# Patient Record
Sex: FEMALE | Race: OTHER | Employment: OTHER | URBAN - METROPOLITAN AREA
[De-identification: names, ages, dates, MRNs, and addresses within clinical notes are randomized per-mention and may not be internally consistent; named-entity substitution may affect disease eponyms.]

---

## 2017-07-28 ENCOUNTER — TELEPHONE (OUTPATIENT)
Dept: NEUROLOGY | Facility: CLINIC | Age: 82
End: 2017-07-28

## 2017-08-01 ENCOUNTER — OFFICE VISIT (OUTPATIENT)
Dept: NEUROLOGY | Facility: CLINIC | Age: 82
End: 2017-08-01

## 2017-08-01 ENCOUNTER — TELEPHONE (OUTPATIENT)
Dept: NEUROLOGY | Facility: CLINIC | Age: 82
End: 2017-08-01

## 2017-08-01 VITALS
WEIGHT: 125 LBS | HEIGHT: 55 IN | RESPIRATION RATE: 16 BRPM | SYSTOLIC BLOOD PRESSURE: 120 MMHG | DIASTOLIC BLOOD PRESSURE: 64 MMHG | BODY MASS INDEX: 28.93 KG/M2 | HEART RATE: 72 BPM

## 2017-08-01 DIAGNOSIS — G93.40 ENCEPHALOPATHY: Primary | ICD-10-CM

## 2017-08-01 PROBLEM — E78.5 HYPERLIPIDEMIA: Status: ACTIVE | Noted: 2017-08-01

## 2017-08-01 PROBLEM — I25.10 CORONARY ARTERY DISEASE: Status: ACTIVE | Noted: 2017-08-01

## 2017-08-01 PROBLEM — F03.91 DEMENTIA WITH BEHAVIORAL DISTURBANCE (HCC): Status: ACTIVE | Noted: 2017-08-01

## 2017-08-01 PROCEDURE — 99204 OFFICE O/P NEW MOD 45 MIN: CPT | Performed by: OTHER

## 2017-08-01 RX ORDER — PRAVASTATIN SODIUM 10 MG
1 TABLET ORAL DAILY
Refills: 0 | Status: ON HOLD | COMMUNITY
Start: 2017-07-14 | End: 2019-11-01

## 2017-08-01 RX ORDER — MECLIZINE HCL 12.5 MG/1
1 TABLET ORAL AS NEEDED
Refills: 0 | COMMUNITY
Start: 2017-07-18

## 2017-08-01 NOTE — TELEPHONE ENCOUNTER
Pt. informed insurance was verified and MRI brain wo is a covered benefit and does not require authorization. Can proceed with scheduling appt. Akin Mcintosh informed no authorization is required. Scheduled MRI on 08/07/17 a Martir.

## 2017-08-01 NOTE — PROGRESS NOTES
Dominguez Hurtadoehan : 1930     HPI:   Patient presents with:  Memory Loss: New patient here for memory loss. Patient here w/ son he states he noticed pt is watching TV and starts talking back. Pt's son stated pt had MRI of Brain done about 4 yrs ago. Surgical History:  No date: KNEE SURGERY Right  No date: SURGICAL STENT   History reviewed. No pertinent family history.    Social History:  Social History    Marital status: Single              Spouse name:                       Years of education: unable to tell me her age. She had some difficulty following simple commands. Cranial Nerves: II-visual fields are full in the left eye. She has no light perception in the right eye. Left fundus exam was unremarkable. .  III,IV,VI- EOM full, with normal

## 2017-08-02 NOTE — TELEPHONE ENCOUNTER
Records:  CT brain in 8/15  Chronic white matter changes. Lab 8/17 Chol 246.  FBS 74. Will need TSH and B12.

## 2017-08-07 ENCOUNTER — HOSPITAL ENCOUNTER (OUTPATIENT)
Dept: MRI IMAGING | Age: 82
Discharge: HOME | End: 2017-08-07
Attending: Other
Payer: MEDICARE

## 2017-08-07 ENCOUNTER — TELEPHONE (OUTPATIENT)
Dept: NEUROLOGY | Facility: CLINIC | Age: 82
End: 2017-08-07

## 2017-08-07 DIAGNOSIS — F02.80 ALZHEIMER'S DISEASE OF OTHER ONSET WITHOUT BEHAVIORAL DISTURBANCE: ICD-10-CM

## 2017-08-07 DIAGNOSIS — G30.8 ALZHEIMER'S DISEASE OF OTHER ONSET WITHOUT BEHAVIORAL DISTURBANCE: ICD-10-CM

## 2017-08-07 DIAGNOSIS — G93.40 ENCEPHALOPATHY: ICD-10-CM

## 2017-08-07 DIAGNOSIS — G93.40 ENCEPHALOPATHY: Primary | ICD-10-CM

## 2017-08-07 PROCEDURE — 70551 MRI BRAIN STEM W/O DYE: CPT | Performed by: OTHER

## 2019-03-30 ENCOUNTER — APPOINTMENT (OUTPATIENT)
Dept: GENERAL RADIOLOGY | Facility: HOSPITAL | Age: 84
End: 2019-03-30
Attending: EMERGENCY MEDICINE
Payer: MEDICARE

## 2019-03-30 ENCOUNTER — HOSPITAL ENCOUNTER (EMERGENCY)
Facility: HOSPITAL | Age: 84
Discharge: NURSING, REHAB OR LONG-TERM CARE | End: 2019-03-30
Attending: EMERGENCY MEDICINE
Payer: MEDICARE

## 2019-03-30 VITALS
SYSTOLIC BLOOD PRESSURE: 108 MMHG | BODY MASS INDEX: 24.3 KG/M2 | OXYGEN SATURATION: 99 % | WEIGHT: 105 LBS | RESPIRATION RATE: 18 BRPM | DIASTOLIC BLOOD PRESSURE: 57 MMHG | HEIGHT: 55 IN | TEMPERATURE: 98 F | HEART RATE: 82 BPM

## 2019-03-30 DIAGNOSIS — J06.9 UPPER RESPIRATORY TRACT INFECTION, UNSPECIFIED TYPE: ICD-10-CM

## 2019-03-30 DIAGNOSIS — S61.412A LACERATION OF LEFT HAND WITHOUT FOREIGN BODY, INITIAL ENCOUNTER: Primary | ICD-10-CM

## 2019-03-30 PROCEDURE — 71045 X-RAY EXAM CHEST 1 VIEW: CPT | Performed by: EMERGENCY MEDICINE

## 2019-03-30 PROCEDURE — 90471 IMMUNIZATION ADMIN: CPT

## 2019-03-30 PROCEDURE — 99284 EMERGENCY DEPT VISIT MOD MDM: CPT

## 2019-03-30 PROCEDURE — 12002 RPR S/N/AX/GEN/TRNK2.6-7.5CM: CPT

## 2019-03-30 NOTE — ED NOTES
Spoke with Carl Archer RN at [facility]. Informed her of treatment here in ER and instructed her that sutures should be removed in 7-10 days. Patient awaits ambulance transport back to [facility].

## 2019-03-30 NOTE — ED NOTES
Son at the bedside. Son did not know what happened to her mom why she has the laceration. Son not sure about tetanus shot.

## 2019-10-24 ENCOUNTER — APPOINTMENT (OUTPATIENT)
Dept: GENERAL RADIOLOGY | Facility: HOSPITAL | Age: 84
DRG: 208 | End: 2019-10-24
Attending: EMERGENCY MEDICINE
Payer: MEDICARE

## 2019-10-24 ENCOUNTER — APPOINTMENT (OUTPATIENT)
Dept: CT IMAGING | Facility: HOSPITAL | Age: 84
DRG: 208 | End: 2019-10-24
Attending: INTERNAL MEDICINE
Payer: MEDICARE

## 2019-10-24 ENCOUNTER — HOSPITAL ENCOUNTER (INPATIENT)
Facility: HOSPITAL | Age: 84
LOS: 8 days | Discharge: NURSING, REHAB OR LONG-TERM CARE | DRG: 208 | End: 2019-11-01
Attending: EMERGENCY MEDICINE | Admitting: INTERNAL MEDICINE
Payer: MEDICARE

## 2019-10-24 DIAGNOSIS — R41.82 ALTERED MENTAL STATUS, UNSPECIFIED ALTERED MENTAL STATUS TYPE: ICD-10-CM

## 2019-10-24 DIAGNOSIS — J96.01 ACUTE HYPOXEMIC RESPIRATORY FAILURE (HCC): Primary | ICD-10-CM

## 2019-10-24 PROCEDURE — 71045 X-RAY EXAM CHEST 1 VIEW: CPT | Performed by: EMERGENCY MEDICINE

## 2019-10-24 PROCEDURE — 5A09357 ASSISTANCE WITH RESPIRATORY VENTILATION, LESS THAN 24 CONSECUTIVE HOURS, CONTINUOUS POSITIVE AIRWAY PRESSURE: ICD-10-PCS | Performed by: EMERGENCY MEDICINE

## 2019-10-24 PROCEDURE — 70450 CT HEAD/BRAIN W/O DYE: CPT | Performed by: INTERNAL MEDICINE

## 2019-10-24 PROCEDURE — 0BH17EZ INSERTION OF ENDOTRACHEAL AIRWAY INTO TRACHEA, VIA NATURAL OR ARTIFICIAL OPENING: ICD-10-PCS | Performed by: EMERGENCY MEDICINE

## 2019-10-24 PROCEDURE — 5A1945Z RESPIRATORY VENTILATION, 24-96 CONSECUTIVE HOURS: ICD-10-PCS | Performed by: EMERGENCY MEDICINE

## 2019-10-24 PROCEDURE — 99223 1ST HOSP IP/OBS HIGH 75: CPT | Performed by: INTERNAL MEDICINE

## 2019-10-24 RX ORDER — ETOMIDATE 2 MG/ML
20 INJECTION INTRAVENOUS ONCE
Status: COMPLETED | OUTPATIENT
Start: 2019-10-24 | End: 2019-10-24

## 2019-10-24 RX ORDER — ETOMIDATE 2 MG/ML
INJECTION INTRAVENOUS
Status: DISPENSED
Start: 2019-10-24 | End: 2019-10-25

## 2019-10-24 RX ORDER — SODIUM CHLORIDE 9 MG/ML
INJECTION, SOLUTION INTRAVENOUS CONTINUOUS
Status: DISCONTINUED | OUTPATIENT
Start: 2019-10-24 | End: 2019-11-01

## 2019-10-24 RX ORDER — MIDAZOLAM HYDROCHLORIDE 5 MG/ML
2 INJECTION INTRAMUSCULAR; INTRAVENOUS ONCE
Status: COMPLETED | OUTPATIENT
Start: 2019-10-24 | End: 2019-10-24

## 2019-10-24 RX ORDER — ROCURONIUM BROMIDE 10 MG/ML
70 INJECTION, SOLUTION INTRAVENOUS ONCE
Status: COMPLETED | OUTPATIENT
Start: 2019-10-24 | End: 2019-10-24

## 2019-10-24 RX ORDER — MIDAZOLAM HYDROCHLORIDE 5 MG/ML
INJECTION INTRAMUSCULAR; INTRAVENOUS
Status: DISPENSED
Start: 2019-10-24 | End: 2019-10-25

## 2019-10-24 NOTE — ED PROVIDER NOTES
Patient Seen in: [facility] Emergency Department      History   Patient presents with:  Dyspnea MARGRET SOB (respiratory)    Stated Complaint: sob    HPI  History is provided by EMS.     27-year-old female with history of hypertension, hyperlipidemia, Rhythm: Regular rhythm. Comments: Tachycardic  Pulmonary:      Breath sounds: Rales present. Abdominal:      Palpations: Abdomen is soft. Musculoskeletal:         General: No tenderness. Skin:     General: Skin is warm.    Neurological:      Comm visualization of the cords after suctioning  Cricoid pressure was applied throughout the procedure. A 7.0 size endotracheal tube was placed at 24 cm at the lip.   Tube placement verified by direct visualization, condensation in the tube, and positive ETCO2 see previous RBC morphology.     Platelet Morphology Normal Normal    Macrocytosis 1+      Polychromasia 1+     RAINBOW DRAW BLUE    Collection Time: 10/24/19  4:17 PM   Result Value Ref Range    Hold Blue Auto Resulted    RAINBOW DRAW LIGHT GREEN    [place] Negative None Seen /HPF   ARTERIAL BLOOD GAS    Collection Time: 10/24/19  4:50 PM   Result Value Ref Range    Sample Site Left Radial     ABG pH 7.33 (L) 7.35 - 7.45    ABG pCO2 52 (H) 35 - 45 mm Hg    ABG PO2 381 (H) 80 - 100 mm Hg    ABG HCO3 25.4 21.0 evaluation.      89yoF with difficulty breathing  - I personally reviewed and interpreted all the ED vitals  - afebrile, hypoxic  - I ordered and personally reviewed the labs and imaging and found XR clear, leukocytosis, no anemia, electrolytes reassuring, provider specified. We recommend that you schedule follow up care with a primary care provider within the next three months to obtain basic health screening including reassessment of your blood pressure.     Medications Prescribed:  Current Discharge Medic

## 2019-10-24 NOTE — ED INITIAL ASSESSMENT (HPI)
Found by snf staff in acute resp distress pta; possible aspiration. bipap on place from ems. Drooling. Awake, protecting own airway at this time. Placed on bipap upon arrival to er.

## 2019-10-24 NOTE — ED NOTES
Resting on cart in no distress. Family at cartside. Awaiting admission at this time. Will continue to monitor.

## 2019-10-24 NOTE — ED NOTES
To ct per cart with rn, er tech, and rt. Remains on continuous cardiac monitoring with frequent vitals.

## 2019-10-24 NOTE — H&P
[facility] [address] Patient Status:  Emergency    1930 MRN [id number]   Location [address] Attending Annel Sullivan MD   Hosp Day # 0 PCP Doc Neville MD temperature 99.6 °F (37.6 °C), temperature source Temporal, resp. rate 21, height 4' 7\" (1.397 m), weight 103 lb 9.9 oz (47 kg), SpO2 100 %, not currently breastfeeding.     Constitutional: Intubated, sedated  HEENT: PERRL  Cardio: RRR, S1 S2  Respiratory: arrival.  -Initial suspicion for possible pulmonary edema although chest imaging with no significant edema or infiltrate seen.   -CT head and ABG pending  -Full ventilatory support at this time  -Empiric antibiotic therapy with Zosyn at this time  -IV hydra

## 2019-10-24 NOTE — ED NOTES
Family at [facility], updated about condition. Awaiting icu bed at this time. Will continue to monitor.

## 2019-10-25 ENCOUNTER — APPOINTMENT (OUTPATIENT)
Dept: GENERAL RADIOLOGY | Facility: HOSPITAL | Age: 84
DRG: 208 | End: 2019-10-25
Attending: INTERNAL MEDICINE
Payer: MEDICARE

## 2019-10-25 PROCEDURE — 71045 X-RAY EXAM CHEST 1 VIEW: CPT | Performed by: INTERNAL MEDICINE

## 2019-10-25 PROCEDURE — 99233 SBSQ HOSP IP/OBS HIGH 50: CPT | Performed by: INTERNAL MEDICINE

## 2019-10-25 RX ORDER — ATORVASTATIN CALCIUM 40 MG/1
40 TABLET, FILM COATED ORAL DAILY
Status: DISCONTINUED | OUTPATIENT
Start: 2019-10-25 | End: 2019-11-01

## 2019-10-25 RX ORDER — POLYETHYLENE GLYCOL 3350 17 G/17G
17 POWDER, FOR SOLUTION ORAL DAILY
COMMUNITY

## 2019-10-25 RX ORDER — KETOROLAC TROMETHAMINE 15 MG/ML
15 INJECTION, SOLUTION INTRAMUSCULAR; INTRAVENOUS ONCE
Status: COMPLETED | OUTPATIENT
Start: 2019-10-25 | End: 2019-10-25

## 2019-10-25 RX ORDER — ATORVASTATIN CALCIUM 40 MG/1
40 TABLET, FILM COATED ORAL DAILY
Status: ON HOLD | COMMUNITY
End: 2020-02-06

## 2019-10-25 RX ORDER — CALCIUM CARBONATE/VITAMIN D3 600 MG-10
1 TABLET ORAL DAILY
Status: ON HOLD | COMMUNITY
End: 2020-02-06

## 2019-10-25 RX ORDER — POTASSIUM CHLORIDE 14.9 MG/ML
20 INJECTION INTRAVENOUS ONCE
Status: COMPLETED | OUTPATIENT
Start: 2019-10-25 | End: 2019-10-25

## 2019-10-25 RX ORDER — ALENDRONATE SODIUM 35 MG/1
35 TABLET ORAL
Status: DISCONTINUED | OUTPATIENT
Start: 2019-10-27 | End: 2019-10-27

## 2019-10-25 RX ORDER — HEPARIN SODIUM 5000 [USP'U]/ML
5000 INJECTION, SOLUTION INTRAVENOUS; SUBCUTANEOUS EVERY 8 HOURS SCHEDULED
Status: DISCONTINUED | OUTPATIENT
Start: 2019-10-25 | End: 2019-10-28

## 2019-10-25 RX ORDER — ENALAPRILAT 2.5 MG/2ML
1.25 INJECTION INTRAVENOUS EVERY 6 HOURS PRN
Status: DISCONTINUED | OUTPATIENT
Start: 2019-10-25 | End: 2019-11-01

## 2019-10-25 RX ORDER — LIDOCAINE 50 MG/G
OINTMENT TOPICAL DAILY
COMMUNITY

## 2019-10-25 RX ORDER — ASPIRIN 81 MG/1
81 TABLET ORAL DAILY
Status: DISCONTINUED | OUTPATIENT
Start: 2019-10-25 | End: 2019-11-01

## 2019-10-25 RX ORDER — ALENDRONATE SODIUM 35 MG/1
35 TABLET ORAL
Status: ON HOLD | COMMUNITY
End: 2020-02-06

## 2019-10-25 RX ORDER — NYSTATIN 100000 U/G
OINTMENT TOPICAL 2 TIMES DAILY
COMMUNITY

## 2019-10-25 RX ORDER — ALLOPURINOL 300 MG/1
TABLET ORAL EVERY 8 HOURS PRN
Status: ON HOLD | COMMUNITY
End: 2019-11-01

## 2019-10-25 RX ORDER — ACETAMINOPHEN 325 MG/1
1000 TABLET ORAL 3 TIMES DAILY
COMMUNITY

## 2019-10-25 RX ORDER — MIDAZOLAM HYDROCHLORIDE 1 MG/ML
1 INJECTION INTRAMUSCULAR; INTRAVENOUS ONCE
Status: COMPLETED | OUTPATIENT
Start: 2019-10-25 | End: 2019-10-25

## 2019-10-25 RX ORDER — MULTIVIT-MIN/IRON/FOLIC ACID/K 18-600-40
CAPSULE ORAL
Status: ON HOLD | COMMUNITY
End: 2020-02-06

## 2019-10-25 RX ORDER — MIDAZOLAM HYDROCHLORIDE 1 MG/ML
INJECTION INTRAMUSCULAR; INTRAVENOUS
Status: COMPLETED
Start: 2019-10-25 | End: 2019-10-25

## 2019-10-25 NOTE — ED NOTES
Received report from [facility]. Awaiting resp therapy to accompany this RN with pt to ICU.

## 2019-10-25 NOTE — CM/SW NOTE
10/25: SW self-referred to patient chart for discharge planning needs. Per nursing rounds, pt is reportedly from [facility]. NE[facility]U called [facility] to confirm. She states yes, pt is from their building and will be able to accept pt once cleared for discharge.

## 2019-10-25 NOTE — RESPIRATORY THERAPY NOTE
Received pt from ED intubated with 7.0 ETT @24. Current vent settings: AC 14/450/+5/50%. Alarm parameters set. RT to continue to closely monitor.

## 2019-10-25 NOTE — PLAN OF CARE
Problem: Safety Risk - Non-Violent Restraints  Goal: Patient will remain free from self-harm  Description  INTERVENTIONS:  - Apply the least restrictive restraint to prevent harm  - Notify patient and family of reasons restraints applied  - Assess for an Not Progressing  Goal: Achieves maximal functionality and self care  Description  INTERVENTIONS  - Monitor swallowing and airway patency with patient fatigue and changes in neurological status  - Encourage and assist patient to increase activity and self c

## 2019-10-25 NOTE — PROGRESS NOTES
[facility] HOSP - [facility]     Progress Note        Daryle Ducking Patient Status:  Inpatient    1930 MRN [id number]   Location [facility] 2W/SW Attending Davina Bedolla Day # 1 PCP Dutch Bee MD       Subject 10/25/2019    .0 10/25/2019    CREATSERUM 0.72 10/25/2019    BUN 23 10/25/2019     10/25/2019    K 3.7 10/25/2019     10/25/2019    CO2 24.0 10/25/2019    GLU 99 10/25/2019    CA 8.3 10/25/2019    TROP <0.045 10/24/2019       Ct Brain Or Electronically signed on 10/24/2019 at 17:07 by Mimi Andres   1. Acute hypoxemic respiratory failure  2. Acute encephalopathy  3. Hypertension  4. Dementia  5. Hyperlipidemia  6.   Leukocytosis     Plan   -Patient presents with evide

## 2019-10-26 ENCOUNTER — APPOINTMENT (OUTPATIENT)
Dept: GENERAL RADIOLOGY | Facility: HOSPITAL | Age: 84
DRG: 208 | End: 2019-10-26
Attending: HOSPITALIST
Payer: MEDICARE

## 2019-10-26 PROCEDURE — 71045 X-RAY EXAM CHEST 1 VIEW: CPT | Performed by: HOSPITALIST

## 2019-10-26 PROCEDURE — 99233 SBSQ HOSP IP/OBS HIGH 50: CPT | Performed by: INTERNAL MEDICINE

## 2019-10-26 PROCEDURE — 99291 CRITICAL CARE FIRST HOUR: CPT | Performed by: HOSPITALIST

## 2019-10-26 RX ORDER — IPRATROPIUM BROMIDE AND ALBUTEROL SULFATE 2.5; .5 MG/3ML; MG/3ML
3 SOLUTION RESPIRATORY (INHALATION) EVERY 6 HOURS PRN
Status: DISCONTINUED | OUTPATIENT
Start: 2019-10-26 | End: 2019-11-01

## 2019-10-26 RX ORDER — HALOPERIDOL 5 MG/ML
INJECTION INTRAMUSCULAR
Status: COMPLETED
Start: 2019-10-26 | End: 2019-10-26

## 2019-10-26 RX ORDER — HALOPERIDOL 5 MG/ML
1 INJECTION INTRAMUSCULAR EVERY 6 HOURS PRN
Status: DISCONTINUED | OUTPATIENT
Start: 2019-10-26 | End: 2019-11-01

## 2019-10-26 RX ORDER — POTASSIUM CHLORIDE 14.9 MG/ML
20 INJECTION INTRAVENOUS ONCE
Status: COMPLETED | OUTPATIENT
Start: 2019-10-26 | End: 2019-10-26

## 2019-10-26 RX ORDER — FUROSEMIDE 10 MG/ML
40 INJECTION INTRAMUSCULAR; INTRAVENOUS ONCE
Status: COMPLETED | OUTPATIENT
Start: 2019-10-26 | End: 2019-10-26

## 2019-10-26 RX ORDER — IPRATROPIUM BROMIDE AND ALBUTEROL SULFATE 2.5; .5 MG/3ML; MG/3ML
SOLUTION RESPIRATORY (INHALATION)
Status: COMPLETED
Start: 2019-10-26 | End: 2019-10-26

## 2019-10-26 NOTE — PLAN OF CARE
Problem: Patient Centered Care  Goal: Patient preferences are identified and integrated in the patient's plan of care  Description  Interventions:  - What would you like us to know as we care for you?   - Provide timely, complete, and accurate informatio sodium.  Initiate appropriate interventions as ordered  Outcome: Not Progressing  Goal: Achieves maximal functionality and self care  Description  INTERVENTIONS  - Monitor swallowing and airway patency with patient fatigue and changes in neurological status

## 2019-10-26 NOTE — SIGNIFICANT EVENT
[facility] HOSPITALIST  RAPID RESPONSE NOTE     Denece Cardinal Patient Status:  Inpatient    1930 MRN [id number]   Location [facility] 2W/SW Attending Camila Covarrubias DO   Hosp Day # 2 PCP Emily Sheppard MD     Reason for RRT: at 7:56     Approved by (CST): Anita Burciaga MD on 10/26/2019 at 7:57          Xr Chest Ap Portable  (cpt=71045)    Result Date: 10/25/2019  CONCLUSION:  1. Borderline cardiomegaly. Tortuous atherosclerotic aorta.  2. Prominent bibasilar markings

## 2019-10-26 NOTE — PROGRESS NOTES
[facility]    Progress Note      Assessment and Plan:   1. Acute respiratory failure– the patient may have aspiration pneumonitis with left basilar haziness and generalized weakness with kyphosis and mild scoliosis.   She had an episode of 0.70 10/26/2019    BUN 15 10/26/2019     10/26/2019    K 3.7 10/26/2019     10/26/2019    CO2 23.0 10/26/2019    GLU 84 10/26/2019    CA 8.3 10/26/2019     Chest x-ray– subtle left supradiaphragmatic infiltrate    rPetty Pulido MD  Medical

## 2019-10-26 NOTE — RESPIRATORY THERAPY NOTE
RT arrived to patient room via . Midevendrasotero Jaime called due to respiratory distress. RT ambu patient with 100% oxygen. Placed patient on % . ABG obtained. HHN with duo given. MD ordered to place patient on BIPAP Ipap15 Epap5 50%. Parameters charted.  Patient t

## 2019-10-26 NOTE — PROGRESS NOTES
S: Code Blue- got to room. Noticed daughter in law in back of room. Patient being worked on by staff  O: Cong Mcclain went to daughter in law. Offered empathetic listening and prayer. Daughter in law was upset.   A: Patient was recovering as we talked a

## 2019-10-27 ENCOUNTER — APPOINTMENT (OUTPATIENT)
Dept: GENERAL RADIOLOGY | Facility: HOSPITAL | Age: 84
DRG: 208 | End: 2019-10-27
Attending: INTERNAL MEDICINE
Payer: MEDICARE

## 2019-10-27 PROCEDURE — 71045 X-RAY EXAM CHEST 1 VIEW: CPT | Performed by: INTERNAL MEDICINE

## 2019-10-27 PROCEDURE — 99232 SBSQ HOSP IP/OBS MODERATE 35: CPT | Performed by: INTERNAL MEDICINE

## 2019-10-27 RX ORDER — ALENDRONATE SODIUM 35 MG/1
35 TABLET ORAL
Status: DISCONTINUED | OUTPATIENT
Start: 2019-10-28 | End: 2019-11-01

## 2019-10-27 RX ORDER — POTASSIUM CHLORIDE 14.9 MG/ML
20 INJECTION INTRAVENOUS ONCE
Status: COMPLETED | OUTPATIENT
Start: 2019-10-27 | End: 2019-10-27

## 2019-10-27 NOTE — PROGRESS NOTES
Pt had removed her o2; also with lg amt mucous in back of throat. Dtr in law at bedside and states pt is not looking good. Pt noted to be cyanotic and minimally responsive. Code blue called by night rn; bagged with 100% o2.  By the time code team appeared s

## 2019-10-27 NOTE — PLAN OF CARE
Problem: Safety Risk - Non-Violent Restraints  Goal: Patient will remain free from self-harm  Description  INTERVENTIONS:  - Apply the least restrictive restraint to prevent harm  - Notify patient and family of reasons restraints applied  - Assess for an rest cycle i.e. lights off, TV off, minimize noise and interruptions  - Encourage family to assist in orientation and promotion of home routines  Outcome: Progressing     Problem: RESPIRATORY - ADULT  Goal: Achieves optimal ventilation and oxygenation  Esau ordered parameters to optimize cerebral perfusion and minimize risk of hemorrhage  - Monitor temperature, glucose, and sodium.  Initiate appropriate interventions as ordered  Outcome: Progressing  Goal: Absence of seizures  Description  INTERVENTIONS  - Mon

## 2019-10-27 NOTE — SLP NOTE
ADULT SWALLOWING EVALUATION    ASSESSMENT    ASSESSMENT/OVERALL IMPRESSION:  Pt seen for a bedside swallowing evaluation secondary post extubation. Pt was admitted on 10/24/19 with acute hypoxemic respiratory failure.   The pt was intubated from 10/24/19 t to obtain any new orders. Per JAQUELINE NOMS functional communication measures, pt's swallow level is 2/7.          RECOMMENDATIONS   Diet Recommendations - Solids: NPO  Diet Recommendations - Liquid: NPO                              Medication Administration R impaired  (Please note: Silent aspiration cannot be evaluated clinically.  Videofluoroscopic Swallow Study is required to rule-out silent aspiration.)    Esophageal Phase of Swallow: Complaints consistent with possible esophageal involvement

## 2019-10-27 NOTE — PROGRESS NOTES
Double RN skin check done prior to transfer off Unit. Skin check performed by this RN and  Toño Acosta are as follows: gen bruising, sacrum reddness, report given to [facility] room 544.     Will remain available for any further questions or concerns

## 2019-10-27 NOTE — RESPIRATORY THERAPY NOTE
Pt taken off BiPAP @0600. Placed on 3L NC saturating >95%. Pt tolerated BiPAP approx 6hrs overnight. RT to continue to monitor.

## 2019-10-27 NOTE — PROGRESS NOTES
[facility]    Progress Note      Assessment and Plan:   1. Acute respiratory failure– the patient may have aspiration pneumonitis with left basilar haziness and generalized weakness with kyphosis and mild scoliosis.   She had an episode of 216.0 10/27/2019    CREATSERUM 0.75 10/27/2019    BUN 16 10/27/2019     10/27/2019    K 3.2 10/27/2019     10/27/2019    CO2 28.0 10/27/2019    GLU 82 10/27/2019    CA 8.4 10/27/2019     Chest x-ray– subtle left supradiaphragmatic infiltrate

## 2019-10-28 ENCOUNTER — APPOINTMENT (OUTPATIENT)
Dept: GENERAL RADIOLOGY | Facility: HOSPITAL | Age: 84
DRG: 208 | End: 2019-10-28
Attending: HOSPITALIST
Payer: MEDICARE

## 2019-10-28 PROBLEM — Z71.89 COUNSELING REGARDING ADVANCE CARE PLANNING AND GOALS OF CARE: Status: ACTIVE | Noted: 2019-10-28

## 2019-10-28 PROCEDURE — 99221 1ST HOSP IP/OBS SF/LOW 40: CPT | Performed by: INTERNAL MEDICINE

## 2019-10-28 PROCEDURE — 71045 X-RAY EXAM CHEST 1 VIEW: CPT | Performed by: HOSPITALIST

## 2019-10-28 PROCEDURE — 99232 SBSQ HOSP IP/OBS MODERATE 35: CPT | Performed by: INTERNAL MEDICINE

## 2019-10-28 PROCEDURE — 99233 SBSQ HOSP IP/OBS HIGH 50: CPT | Performed by: HOSPITALIST

## 2019-10-28 RX ORDER — GLYCOPYRROLATE 0.2 MG/ML
0.2 INJECTION, SOLUTION INTRAMUSCULAR; INTRAVENOUS EVERY 6 HOURS PRN
Status: DISCONTINUED | OUTPATIENT
Start: 2019-10-28 | End: 2019-10-31

## 2019-10-28 RX ORDER — KETOROLAC TROMETHAMINE 15 MG/ML
15 INJECTION, SOLUTION INTRAMUSCULAR; INTRAVENOUS EVERY 6 HOURS PRN
Status: DISCONTINUED | OUTPATIENT
Start: 2019-10-28 | End: 2019-10-28

## 2019-10-28 RX ORDER — SCOLOPAMINE TRANSDERMAL SYSTEM 1 MG/1
1 PATCH, EXTENDED RELEASE TRANSDERMAL
Status: DISCONTINUED | OUTPATIENT
Start: 2019-10-28 | End: 2019-10-29

## 2019-10-28 RX ORDER — HEPARIN SODIUM 5000 [USP'U]/ML
5000 INJECTION, SOLUTION INTRAVENOUS; SUBCUTANEOUS EVERY 12 HOURS SCHEDULED
Status: DISCONTINUED | OUTPATIENT
Start: 2019-10-28 | End: 2019-11-01

## 2019-10-28 RX ORDER — IPRATROPIUM BROMIDE AND ALBUTEROL SULFATE 2.5; .5 MG/3ML; MG/3ML
3 SOLUTION RESPIRATORY (INHALATION) 2 TIMES DAILY
Status: DISCONTINUED | OUTPATIENT
Start: 2019-10-28 | End: 2019-10-30

## 2019-10-28 NOTE — SLP NOTE
ADULT SWALLOWING RE-EVALUATION    ASSESSMENT    ASSESSMENT/OVERALL IMPRESSION:  Pt seen for a bedside re-evaluation. Pt was admitted on 10/24/19 with acute hypoxemic respiratory failure. The pt was intubated from 10/24/19 to 10/25/19.  The pt is on 2 L obed Problem:    Acute hypoxemic respiratory failure (HCC)  Active Problems:    Altered mental status, unspecified altered mental status type    Counseling regarding advance care planning and goals of care      Past Medical History  Past Medical History:   Diag to reassess/fam education  Number of Visits to Meet Established Goals: 3  Follow Up Needed: Yes  SLP Follow-up Date: 10/29/19    Thank you for your referral.   If you have any questions, please contact   Isaac Sanchez MA, CCC-SLP  Speech-Language Path

## 2019-10-28 NOTE — PROGRESS NOTES
Pulmonary/Critical Care Follow Up Note    HPI:   Galilea Parekh is a 80year old female with Patient presents with:  Dyspnea MARGRET SOB (respiratory)      PCP Tequila Longoria MD  Admission Attending Richie 1 Day #4    Conf WBC 7.7 10/28/2019    HGB 9.7 10/28/2019    HCT 31.3 10/28/2019    .0 10/28/2019    CREATSERUM 0.66 10/28/2019    BUN 21 10/28/2019     10/28/2019    K 3.8 10/28/2019     10/28/2019    CO2 22.0 10/28/2019    GLU 71 10/28/2019    CA 8.

## 2019-10-28 NOTE — PROGRESS NOTES
[facility]    Progress Note    Tiffany Yeager Patient Status:  Inpatient    1930 MRN [id number]   Location [facility] 5SW/SE Attending Enzo Henderson MD   Hosp Day # 4 PCP Luisa Vincent MD       Subjective: 10/28/19  0654   RBC 3.16* 3.01* 3.06*   HGB 10.1* 9.8* 9.7*   HCT 32.3* 30.6* 31.3*   .2* 101.7* 102.3*   MCH 32.0 32.6 31.7   MCHC 31.3 32.0 31.0   RDW 13.4 13.4 13.2   NEPRELIM 7.38 9.21* 6.54   WBC 9.2 11.3* 7.7   .0 216.0 232.0     Recen Gisella Vaughn MD on 10/27/2019 at 9:32     Approved by (CST): Gisella Burciaga MD on 10/27/2019 at 9:33          Xr Chest Ap Portable  (cpt=71045)    Result Date: 10/26/2019  CONCLUSION:  1.  Persistent mild left perihilar and left lower lung opacificat days  -rubinol for secretions  -appreciate palliative care seeing patient today, plan family meeting tomorrow      Acute metabolic encephalopathy   Due to above / h/o dementia   -monitor   -PT OT if able      Code : DNR  VTE P: heparin         Greater than

## 2019-10-28 NOTE — PLAN OF CARE
Problem: Safety Risk - Non-Violent Restraints  Goal: Patient will remain free from self-harm  Description  INTERVENTIONS:  - Apply the least restrictive restraint to prevent harm  - Notify patient and family of reasons restraints applied  - Assess for an assistive/communication devices  Outcome: Not Progressing

## 2019-10-28 NOTE — CONSULTS
[address]  [id number]  Hospital Day #4  Date of Consult: 10/28/19  Patient seen at: [address]    Reason for Consultation:      Consult requested by Dr Yvonne Parsons for e Information:  Congregation Affiliation: did not explore    Functional Status: mostly in bed at this time, did not explore further but will meet again tomorrow    Substance History     Smoking status did not explore  History of Substance abuse did not explore on 10/27/2019 at 9:32     Approved by (CST): Jennifer Burciaga MD on 10/27/2019 at 9:33              Review of Systems:      Palliative Care symptom needs assessed:      Dyspnea: none  Cough: none  Nausea: none  Pain: none    Objective/Physical Exam: extra layer of support, facilitate GOC/Advanced Care Planning discussions, ensure GOC are respected throughout healthcare continuum and assist with transition to hospice care when appropriate.      We discussed the concern regarding trajectory of disease pr DNR/DNI CODE STATUS  - POLST: deferred for now  - [facility] [place], [facility]. surrogate: Ple[place] her son [phone]    3. Symptoms management. Agree with Scopolamine however may be too sedating. Consider Glycopyrrolate prn. Will add.     4. Psychosocial: Emotional

## 2019-10-28 NOTE — PLAN OF CARE
Received Patient from CCU at 1730. Patient with family at bedside, vital signs stable. Patient had a gurgling and crackles. Respiratory called stat and nasal/oral suctioned. Patient reassessed  and patient lung sounds clear to ausculation.  RT called to mo

## 2019-10-28 NOTE — PLAN OF CARE
Problem: Safety Risk - Non-Violent Restraints  Goal: Patient will remain free from self-harm  Description  INTERVENTIONS:  - Apply the least restrictive restraint to prevent harm  - Notify patient and family of reasons restraints applied  - Assess for an sleep, encourage patient's normal rest cycle i.e. lights off, TV off, minimize noise and interruptions  - Encourage family to assist in orientation and promotion of home routines  Outcome: Progressing     Problem: RESPIRATORY - ADULT  Goal: Achieves optima patients to use assistive/communication devices  Outcome: Progressing

## 2019-10-29 ENCOUNTER — APPOINTMENT (OUTPATIENT)
Dept: GENERAL RADIOLOGY | Facility: HOSPITAL | Age: 84
DRG: 208 | End: 2019-10-29
Attending: HOSPITALIST
Payer: MEDICARE

## 2019-10-29 PROCEDURE — 99232 SBSQ HOSP IP/OBS MODERATE 35: CPT | Performed by: INTERNAL MEDICINE

## 2019-10-29 PROCEDURE — 99233 SBSQ HOSP IP/OBS HIGH 50: CPT | Performed by: HOSPITALIST

## 2019-10-29 PROCEDURE — 99233 SBSQ HOSP IP/OBS HIGH 50: CPT | Performed by: INTERNAL MEDICINE

## 2019-10-29 PROCEDURE — 71045 X-RAY EXAM CHEST 1 VIEW: CPT | Performed by: HOSPITALIST

## 2019-10-29 NOTE — OCCUPATIONAL THERAPY NOTE
OCCUPATIONAL THERAPY EVALUATION - INPATIENT     Room Number: 544/544-A  Evaluation Date: 10/29/2019  Type of Evaluation: Initial  Presenting Problem: (Acute hypoxemic respiratory failure )    Physician Order: IP Consult to Occupational Therapy  Reason for Discharge Recommendations: 24 hour care/supervision;Sub-acute rehabilitation       PLAN  OT Treatment Plan: Balance activities; Energy conservation/work simplification techniques;ADL training;Functional transfer training;UE strengthening/ROM; Endurance train time     SENSATION  Unable to accurately assess at this time     Communication: Minimal active responses at this time, son reports this is due to medication     RANGE OF MOTION   Upper extremity ROM is within functional limits passively     STRENGTH ASSESS with mod.  A    Comment:               Goals  on: 11/10/2019  Frequency: 3x/week     Lisa Almendarez OTR/L

## 2019-10-29 NOTE — DIETARY NOTE
ADULT NUTRITION INITIAL ASSESSMENT    Pt is at high nutrition risk. Pt meets moderate malnutrition criteria. RECOMMENDATIONS TO MD:  See Nutrition Intervention for TF orders.      CRITERIA FOR MALNUTRITION DIAGNOSIS: Criteria for non-severe malnutriti discontinue IV fluids when TF and FWF starts. · Above orders discussed with RN.   - Coordination of nutrition care: collaboration with other providers  - Discharge and transfer of nutrition care to new setting or provider:  monitor plans. Pt lives in [place]. calories/day (25-30 calories per kg Actual body wt (ABW))  Protein: 53 g protein/day (1.2 g protein/kg  Actual body wt (ABW))  Fluids: ~ 1350  ml /day (30 ml/kg )    MONITOR AND EVALUATE/NUTRITION GOALS:  - Food and Nutrient Administration: Monitor: tolera

## 2019-10-29 NOTE — PROGRESS NOTES
[facility]    Progress Note    Atlee Bread Patient Status:  Inpatient    1930 MRN [id number]   Location [facility] 5SW/SE Attending Atif Garcia MD   Hosp Day # 5 PCP Wade Gregg MD       Subjective: 9. 8* 9.7*   HCT 32.3* 30.6* 31.3*   .2* 101.7* 102.3*   MCH 32.0 32.6 31.7   MCHC 31.3 32.0 31.0   RDW 13.4 13.4 13.2   NEPRELIM 7.38 9.21* 6.54   WBC 9.2 11.3* 7.7   .0 216.0 232.0     Recent Labs   Lab 10/26/19  0432 10/27/19  0510 10/27/19 10/28/2019 at 12:40     Approved by (CST): Brian Hill MD on 10/28/2019 at 12:41          Xr Chest Ap Portable  (cpt=71045)    Result Date: 10/27/2019  CONCLUSION:  1. Decreased left perihilar opacification.     Dictated by (CST): ANAIS Mcclellan old anterior infarct.  ABNORMAL When compared with ECG of 10/24/2019 16:10:59 RATE SLOWER Electronically signed on 10/28/2019 at 16:37 by Loraine Galindo and Plan:   Acute hypoxemic respiratory failure ([address]  Secondary to aspiration GUERITA / Dara Deluca

## 2019-10-29 NOTE — PROGRESS NOTES
[facility] HOSP - [facility]     Progress Note        Marika Varghese Patient Status:  Inpatient    1930 MRN [id number]   Location [facility] 2W/SW Attending Megan Dee,    Hosp Day # 5 PCP Kourtney Pleitez MD       Subject arousable  HEENT: PERRL  Cardio: RRR, S1 S2  Respiratory: Faint basilar crackles  GI: abdomen soft, non tender  Extremities: no clubbing, cyanosis, edema  Neurologic: no gross motor deficits  Skin: warm, dry      Results:          Xr Chest Ap Portable  (cp time  -Aspiration precautions  -IV hydration  -DVT prophylaxis: Heparin  -Discussed with son. Patient made DNR/DNI. Ongoing discussion regarding goals of care.     Ilda Horta, DO  Pulmonary [address]

## 2019-10-29 NOTE — PLAN OF CARE
Problem: ALTERED NUTRIENT INTAKE - ADULT  Goal: Nutrient intake appropriate for improving, restoring or maintaining nutritional needs  Description  INTERVENTIONS:  - Monitor nutritional status and recommend course of action  - Monitor labs, and treatment

## 2019-10-29 NOTE — PLAN OF CARE
Problem: Safety Risk - Non-Violent Restraints  Goal: Patient will remain free from self-harm  Description  INTERVENTIONS:  - Apply the least restrictive restraint to prevent harm  - Notify patient and family of reasons restraints applied  - Assess for an frequent reorientation  - Promote wakefulness i.e. lights on, blinds open  - Promote sleep, encourage patient's normal rest cycle i.e. lights off, TV off, minimize noise and interruptions  - Encourage family to assist in orientation and promotion of home r with guidance from PT/OT  - Encourage visually impaired, hearing impaired and aphasic patients to use assistive/communication devices  Outcome: Progressing     Problem: PAIN - ADULT  Goal: Verbalizes/displays adequate comfort level or patient's stated pain interpreters to assist at discharge as needed  - Consider post-discharge preferences of patient/family/discharge partner  - Complete POLST form as appropriate  - Assess patient's ability to be responsible for managing their own health  - Refer to [facility] nutritional consult as needed  - Establish a toileting routine/schedule  - Consider collaborating with pharmacy to review patient's medication profile  Outcome: Progressing     Problem: METABOLIC/FLUID AND ELECTROLYTES - ADULT  Goal: Electrolytes maintaine wound care per orders  - Initiate isolation precautions as appropriate  - Initiate Pressure Ulcer prevention bundle as indicated  Outcome: Progressing  Goal: Oral mucous membranes remain intact  Description  INTERVENTIONS  - Assess oral mucosa and hygiene

## 2019-10-29 NOTE — PHYSICAL THERAPY NOTE
Orders received and chart reviewed. Discussed with SONIDO Purcell. Patient not appropriate for therapy at this time; minimally responsive. Has not eaten in a few days. Will follow up on 10/30 as patient is appropriate.

## 2019-10-29 NOTE — PROGRESS NOTES
[address] [place] [address]  [id number]  Hospital Day #5  Date of Consult: 10/28/19  Patient seen at: [address]    Subjective:      Patient was seen and examined with her son Ale Trent at the mg, Oral, Daily  •  enalaprilat (VASOTEC) injection 1.25 mg, 1.25 mg, Intravenous, Q6H PRN  •  Piperacillin Sod-Tazobactam So (ZOSYN) 3.375 g in dextrose 5 % 100 mL ADD-vantage, 3.375 g, Intravenous, Q8H  •  0.9% NaCl infusion, , Intravenous, Continuous  N Significant disease  Can’t perform hobby Occasional  Assist Normal or   Reduced Full or confused   50 Mainly sit/lie Extensive Disease  Can’t do any work   Partial Assist Normal or Reduced Full or confused   40 Mainly in bed Extensive Disease  Can’t do any with Dr Yvonne Parsons - he will address nutrition and possible temporary feeding tube    A total of 35 minutes were spent on this consult; which included all of the following: direct face to face contact, physical examination and >50% was spent counseling and c

## 2019-10-29 NOTE — CM/SW NOTE
SW received MDO for POLST. No current DNR in [facility]. Blank POLST form placed in pt's chart - will need all signatures.      PLAN: Return to [facility]. [id number]

## 2019-10-30 ENCOUNTER — APPOINTMENT (OUTPATIENT)
Dept: GENERAL RADIOLOGY | Facility: HOSPITAL | Age: 84
DRG: 208 | End: 2019-10-30
Attending: HOSPITALIST
Payer: MEDICARE

## 2019-10-30 PROCEDURE — 99232 SBSQ HOSP IP/OBS MODERATE 35: CPT | Performed by: INTERNAL MEDICINE

## 2019-10-30 PROCEDURE — 99232 SBSQ HOSP IP/OBS MODERATE 35: CPT | Performed by: HOSPITALIST

## 2019-10-30 PROCEDURE — 71045 X-RAY EXAM CHEST 1 VIEW: CPT | Performed by: HOSPITALIST

## 2019-10-30 PROCEDURE — 99233 SBSQ HOSP IP/OBS HIGH 50: CPT | Performed by: INTERNAL MEDICINE

## 2019-10-30 NOTE — PROGRESS NOTES
[facility]     Progress Note        [facility] Patient Status:  Inpatient    1930 MRN [id number]   Location [facility] 2W/SW Attending Clyde Greene,    Hosp Day # 6 PCP Mary Lui MD       Subject Intravenous, Continuous        Continuous Infusions:   • dextrose     • sodium chloride 50 mL/hr at 10/28/19 0806       Physical Exam  Constitutional: Arousable  HEENT: PERRL  Cardio: RRR, S1 S2  Respiratory: Faint basilar crackles  GI: abdomen soft, non t lung bases, and left perihilar region. Findings may represent aspiration pneumonia. 3. Mild cardiomegaly. 4. Atherosclerosis aorta.     Dictated by (CST): Patt Olszewski, MD on 10/30/2019 at 6:43     Approved by (CST): Patt Olszewski, MD on 10/30/2019 at 6:4

## 2019-10-30 NOTE — PLAN OF CARE
Problem: Safety Risk - Non-Violent Restraints  Goal: Patient will remain free from self-harm  Description  INTERVENTIONS:  - Apply the least restrictive restraint to prevent harm  - Notify patient and family of reasons restraints applied  - Assess for an frequent reorientation  - Promote wakefulness i.e. lights on, blinds open  - Promote sleep, encourage patient's normal rest cycle i.e. lights off, TV off, minimize noise and interruptions  - Encourage family to assist in orientation and promotion of home r with guidance from PT/OT  - Encourage visually impaired, hearing impaired and aphasic patients to use assistive/communication devices  Outcome: Progressing     Problem: PAIN - ADULT  Goal: Verbalizes/displays adequate comfort level or patient's stated pain interpreters to assist at discharge as needed  - Consider post-discharge preferences of patient/family/discharge partner  - Complete POLST form as appropriate  - Assess patient's ability to be responsible for managing their own health  - Refer to [facility] toileting routine/schedule  - Consider collaborating with pharmacy to review patient's medication profile  Outcome: Progressing     Problem: METABOLIC/FLUID AND ELECTROLYTES - ADULT  Goal: Electrolytes maintained within normal limits  Description  INTERVEN precautions as appropriate  - Initiate Pressure Ulcer prevention bundle as indicated  Outcome: Progressing  Goal: Oral mucous membranes remain intact  Description  INTERVENTIONS  - Assess oral mucosa and hygiene practices  - Implement preventative oral hyg this night. Discussed orders with Dr. Brandi Talavera for BiPAP needs and restraints. Patient's restraints loosened per staff assessment of tolerance, patient appeared to tolerate well. At approximately 0125, patient found with NG tube out.   Wrists restraints

## 2019-10-30 NOTE — SLP NOTE
ADULT SWALLOWING EVALUATION    ASSESSMENT    ASSESSMENT/OVERALL IMPRESSION:  Received orders for Bedside swallowing evaluation secondary to improved alertness and the pt tolerating secretions.   Chart reviewed and Swallowing evaluation appropriate secondary elevation in ROM/strength to palpation. Overt clinical signs of aspiration observed on thin liquids with a throat clear and wet vocal quality.   No overt clinical signs of aspiration were observed with puree, honey thick or nectar thick liquids: no reflexi List  Principal Problem:    Acute hypoxemic respiratory failure (HCC)  Active Problems:    Altered mental status, unspecified altered mental status type    Counseling regarding advance care planning and goals of care    Increased tracheal secretions      P aspiration.)    Esophageal Phase of Swallow: No complaints consistent with possible esophageal involvement      GOALS  Goal #1 The patient will tolerate puree consistency and nectar thick liquids without overt signs or symptoms of aspiration with 100 % acc

## 2019-10-30 NOTE — PLAN OF CARE
Problem: Safety Risk - Non-Violent Restraints  Goal: Patient will remain free from self-harm  Description  INTERVENTIONS:  - Apply the least restrictive restraint to prevent harm  - Notify patient and family of reasons restraints applied  - Assess for an minimize respiratory effort  - Oxygen supplementation based on oxygen saturation or ABGs  - Provide Smoking Cessation handout, if applicable  - Encourage broncho-pulmonary hygiene including cough, deep breathe, Incentive Spirometry  - Assess the need for s evaluate response  - Consider cultural and social influences on pain and pain management  - Manage/alleviate anxiety  - Utilize distraction and/or relaxation techniques  - Monitor for opioid side effects  - Notify MD/LIP if interventions unsuccessful or pa Barrier  Goal: Patient/Family is able to understand and participate in their care  Description  Interventions:  - Assess communication ability and preferred communication style  - Implement communication aides and strategies  - Use visual cues when possibl Instruct patient on fluid and nutrition restrictions as appropriate  Outcome: Progressing  Goal: Hemodynamic stability and optimal renal function maintained  Description  INTERVENTIONS:  - Monitor labs and assess for signs and symptoms of volume excess or rate  - No straws  - Encourage small bites of food and small sips of liquid  - Offer pills one at a time, crush or deliver with applesauce as needed  - Discontinue feeding and notify MD (or speech pathologist) if coughing or persistent throat clearing or w

## 2019-10-30 NOTE — PROGRESS NOTES
[address] [place] [address]  [id number]  Hospital Day #6  Date of Consult: 10/28/19  Patient seen at: [address]    Subjective:      Patient was seen and examined with her son Josesito Gerard at the covering pads  Anxiety: none seen    Allergies: No Known Allergies    Medications:       Current Facility-Administered Medications:   •  potassium chloride 40 mEq in sodium chloride 0.9% 250 mL IVPB, 40 mEq, Intravenous, Once  •  Pantoprazole Sodium (CARI pointing cephalad at the level of the GE junction. 2. Small region of atelectasis or pneumonia left perihilar region. 3. Minimal bibasilar atelectasis.     Dictated by (CST): Nory Briceño MD on 10/30/2019 at 7:01     Approved by (CST): Nory Briceño MD o Dictated by (CST): Pia Luo MD on 10/28/2019 at 12:40     Approved by (CST): Pia Luo MD on 10/28/2019 at 12:41            Objective/Physical Exam:     Vital Signs: /59 (BP Location: Right arm)   Pulse 59   Temp 97.6 °F (36.4 °C) (Oral)   Re safely now  In order to do this, we must revisit bedside swallowing again, RN called speech therapist  Son needs to go to work right now and can't help with this unless its done when he returns during his lunch hour  If she is unable to perform bedside swa out again or doesn't tolerate it, he will consider total comfort care  He has been in contact with his siblings  One brother is scheduled to visit her sometime Friday or Saturday    We will continue to follow and support Isidro Mtz as we are taking this day by

## 2019-10-30 NOTE — PROGRESS NOTES
[facility]    [facility]    Marin Carroll Patient Status:  Inpatient    1930 MRN [id number]   Location [facility] 5SW/SE Attending Juan Castaneda MD   Hosp Day # 6 PCP Nando Collazo MD       Subjective: glycopyrrolate, ipratropium-albuterol, haloperidol lactate, enalaprilat    Results:     Recent Labs   Lab 10/27/19  0511 10/28/19  0654 10/30/19  0706   RBC 3.01* 3.06* 3.03*   HGB 9.8* 9.7* 9.8*   HCT 30.6* 31.3* 30.2*   .7* 102.3* 99.7   MCH 32.6 10/30/2019  CONCLUSION:  1. Feeding tube suboptimally positioned extending into the gastric fundus and coronal in back on itself with the tip pointing cephalad at the level of the GE junction.  2. Small region of atelectasis or aspiration pneumonia in the l (CST): Bairon Burciaga MD on 10/27/2019 at 9:33          Xr Chest Ap Portable  (cpt=71045)    Result Date: 10/26/2019  CONCLUSION:  1. Persistent mild left perihilar and left lower lung opacification.     Dictated by (CST): ANAIS Caputo following commands  -no NG reinsertion for now  -appreciate palliative care seeing patient  -possibly rehab with palliative care f/u vs hospice depending on her clinical course in the next few days    Acute metabolic encephalopathy   Due to above / h/o dem

## 2019-10-30 NOTE — PHYSICAL THERAPY NOTE
PT order received and chart reviewed. Spoke to [employer] POA. Pt is a resident of a LTC facility and has been for over a year and is bedbound requiring 1 person max A in transfers to the w/c and needed assist in all aspect  of her mobilities and self care.

## 2019-10-31 PROCEDURE — 99233 SBSQ HOSP IP/OBS HIGH 50: CPT | Performed by: INTERNAL MEDICINE

## 2019-10-31 PROCEDURE — 99232 SBSQ HOSP IP/OBS MODERATE 35: CPT | Performed by: INTERNAL MEDICINE

## 2019-10-31 PROCEDURE — 99233 SBSQ HOSP IP/OBS HIGH 50: CPT | Performed by: HOSPITALIST

## 2019-10-31 RX ORDER — GLYCOPYRROLATE 1 MG/1
1 TABLET ORAL 3 TIMES DAILY PRN
Status: DISCONTINUED | OUTPATIENT
Start: 2019-10-31 | End: 2019-11-01

## 2019-10-31 NOTE — CM/SW NOTE
MD order received regarding referral for community palliative care. Referral made to [facility]. The plan is for the pt. To return to [address] [place] [address] when medically stable.   The pt.'s son Aniyah Muro would like to be notified of discharge time w

## 2019-10-31 NOTE — PROGRESS NOTES
[facility]    Progress Note    Marika Varghese Patient Status:  Inpatient    1930 MRN [id number]   Location [facility] 5SW/SE Attending Juan David Garcia MD   Hosp Day # 7 PCP Kourtney Pleitez MD       Subjective: 9. 8* 9.7* 9.8*   HCT 30.6* 31.3* 30.2*   .7* 102.3* 99.7   MCH 32.6 31.7 32.3   MCHC 32.0 31.0 32.5   RDW 13.4 13.2 12.8   NEPRELIM 9.21* 6.54 6.62   WBC 11.3* 7.7 8.4   .0 232.0 217.0     Recent Labs   Lab 10/27/19  0510  10/28/19  0654 10/3 gastric fundus and coronal in back on itself with the tip pointing cephalad at the level of the GE junction. 2. Small region of atelectasis or aspiration pneumonia in the left perihilar region and right lower lobe.  3. Interval clearing of left basilar atel Portable  (cpt=71045)    Result Date: 10/26/2019  CONCLUSION:  1. Persistent mild left perihilar and left lower lung opacification.     Dictated by (CST): Gilford Speck, MD on 10/26/2019 at 7:56     Approved by (CST): Gilford Speck, MD on seeing patient  -possibly dc to rehab with palliative care f/u in AM    Acute metabolic encephalopathy   Stable   Due to above / h/o dementia   -monitor   -PT OT when able  -removed scop patch      Code : DNR  GI P: PPI   VTE P: heparin     Greater than 35

## 2019-10-31 NOTE — SLP NOTE
SPEECH DAILY NOTE - INPATIENT    ASSESSMENT & PLAN   ASSESSMENT  Patient seen to monitor tolerance of PO diet and train compensatory strategies. Patient was sitting semi upright in bed. Son was feeding her nectar thick liquids by spoon.   Head of bed was recommended strategies. In Progress   Goal #3 The patient will tolerate trial upgrade of puree consistency and thin  liquids without overt signs or symptoms of aspiration with 100 % accuracy over 3 session(s).     Not assessed today  In Progress   Goal #4

## 2019-10-31 NOTE — PROGRESS NOTES
[address]  [id number]  Hospital Day #7  Date of Consult: 10/28/19  Patient seen at: [address]    Subjective:      Patient was seen and examined with no one else at the b Intravenous, Daily  •  dextrose 10 % infusion, , Intravenous, Continuous PRN  •  Heparin Sodium (Porcine) 5000 UNIT/ML injection 5,000 Units, 5,000 Units, Subcutaneous, 2 times per day  •  glycopyrrolate (ROBINUL) 0.2 MG/ML injection 0.2 mg, 0.2 mg, Senthil Hernandez of the GE junction. 2. Small region of atelectasis or aspiration pneumonia in the left perihilar region and right lower lobe. 3. Interval clearing of left basilar atelectasis. 4. Atherosclerosis aorta. 5. Stable cardiomegaly. No acute CHF.     Dictated by disease  Normal w/effort Full Normal or  Reduced Full   70 Reduced Some disease  Can’t perform job Full Normal or   Reduced Full   60 Reduced Significant disease  Can’t perform hobby Occasional  Assist Normal or   Reduced Full or confused   50 Mainly sit/l calm today.     2. Advance Care Planning:   - CODE STATUS:  DNR/DNI CODE STATUS  - POLST: NEW POLST FORM COMPLETED TO REFLECT DNR / DNI CODE STATUS, NO INTUBATION AND NO MECHANICAL VENTILATION; SELECTIVE TREATMENTS IN SECTION B AND NO FEEDING TUBE IN SECTIO

## 2019-10-31 NOTE — PROGRESS NOTES
[facility]     Progress Note        Marika Varghese Patient Status:  Inpatient    1930 MRN [id number]   Location [facility] 2W/SW Attending Megan Dee, DO   Hosp Day # 7 PCP Kourtney Pleitez MD       Subject crackles  GI: abdomen soft, non tender  Extremities: no clubbing, cyanosis, edema  Neurologic: no gross motor deficits  Skin: warm, dry      Results:     Lab Results   Component Value Date    K 3.3 10/31/2019       Xr Chest Ap Portable  (cpt=71045)    Resu MD BRIAN on 10/29/2019 at 12:42                  Assessment   1. Acute hypoxemic respiratory failure  2. Acute encephalopathy  3. Hypertension  4. Dementia  5. Hyperlipidemia  6.   Likely aspiration pneumonia     Plan   -Patient presents with evidence of

## 2019-10-31 NOTE — PLAN OF CARE
Problem: Safety Risk - Non-Violent Restraints  Goal: Patient will remain free from self-harm  Description  INTERVENTIONS:  - Apply the least restrictive restraint to prevent harm  - Notify patient and family of reasons restraints applied  - Assess for an encourage patient's normal rest cycle i.e. lights off, TV off, minimize noise and interruptions  - Encourage family to assist in orientation and promotion of home routines  Outcome: Progressing     Problem: RESPIRATORY - ADULT  Goal: Achieves optimal venti to use assistive/communication devices  Outcome: Progressing     Problem: PAIN - ADULT  Goal: Verbalizes/displays adequate comfort level or patient's stated pain goal  Description  INTERVENTIONS:  - Encourage pt to monitor pain and request assistance  - As patient/family/discharge partner  - Complete POLST form as appropriate  - Assess patient's ability to be responsible for managing their own health  - Refer to Case Management Department for coordinating discharge planning if the patient needs post-hospital medication profile  Outcome: Progressing     Problem: METABOLIC/FLUID AND ELECTROLYTES - ADULT  Goal: Electrolytes maintained within normal limits  Description  INTERVENTIONS:  - Monitor labs and rhythm and assess patient for signs and symptoms of electrol indicated  Outcome: Progressing  Goal: Oral mucous membranes remain intact  Description  INTERVENTIONS  - Assess oral mucosa and hygiene practices  - Implement preventative oral hygiene regimen  - Implement oral medicated treatments as ordered  Outcome: Pr

## 2019-10-31 NOTE — PLAN OF CARE
Problem: Safety Risk - Non-Violent Restraints  Goal: Patient will remain free from self-harm  Description  INTERVENTIONS:  - Apply the least restrictive restraint to prevent harm  - Notify patient and family of reasons restraints applied  - Assess for an wakefulness i.e. lights on, blinds open  - Promote sleep, encourage patient's normal rest cycle i.e. lights off, TV off, minimize noise and interruptions  - Encourage family to assist in orientation and promotion of home routines  Outcome: Progressing Encourage visually impaired, hearing impaired and aphasic patients to use assistive/communication devices  Outcome: Progressing     Problem: PAIN - ADULT  Goal: Verbalizes/displays adequate comfort level or patient's stated pain goal  Description  INTERVEN needed  - Consider post-discharge preferences of patient/family/discharge partner  - Complete POLST form as appropriate  - Assess patient's ability to be responsible for managing their own health  - Refer to Case Management Department for coordinating disc collaborating with pharmacy to review patient's medication profile  Outcome: Progressing     Problem: METABOLIC/FLUID AND ELECTROLYTES - ADULT  Goal: Electrolytes maintained within normal limits  Description  INTERVENTIONS:  - Monitor labs and rhythm and a Pressure Ulcer prevention bundle as indicated  Outcome: Progressing  Goal: Oral mucous membranes remain intact  Description  INTERVENTIONS  - Assess oral mucosa and hygiene practices  - Implement preventative oral hygiene regimen  - Implement oral medicate

## 2019-11-01 VITALS
SYSTOLIC BLOOD PRESSURE: 114 MMHG | RESPIRATION RATE: 18 BRPM | OXYGEN SATURATION: 94 % | DIASTOLIC BLOOD PRESSURE: 47 MMHG | HEART RATE: 70 BPM | WEIGHT: 98.13 LBS | TEMPERATURE: 97 F | HEIGHT: 55 IN | BODY MASS INDEX: 22.71 KG/M2

## 2019-11-01 PROCEDURE — 99239 HOSP IP/OBS DSCHRG MGMT >30: CPT | Performed by: HOSPITALIST

## 2019-11-01 PROCEDURE — 99232 SBSQ HOSP IP/OBS MODERATE 35: CPT | Performed by: INTERNAL MEDICINE

## 2019-11-01 RX ORDER — GLYCOPYRROLATE 1 MG/1
1 TABLET ORAL 3 TIMES DAILY PRN
Qty: 60 TABLET | Refills: 0 | Status: SHIPPED | OUTPATIENT
Start: 2019-11-01 | End: 2019-11-22

## 2019-11-01 RX ORDER — POTASSIUM CHLORIDE 20 MEQ/1
40 TABLET, EXTENDED RELEASE ORAL ONCE
Status: COMPLETED | OUTPATIENT
Start: 2019-11-01 | End: 2019-11-01

## 2019-11-01 NOTE — DISCHARGE SUMMARY
[place] [facility]    Discharge Summary    Tallnora Montemayor Patient Status:  Inpatient    1930 MRN [id number]   Location [facility] 5SW/SE Attending Jimmie Duarte MD   Hosp Day # 8 PCP Nedra Curry MD     Date of breakfast earlier today without any significant complaints. Denies any recent known clinical worsening. Claims she only takes daily aspirin. Minimally arousable and unable to provide history. Significant frothy respirations are appreciated.   Did not to atorvastatin 40 MG Tabs  Commonly known as:  LIPITOR      Take 40 mg by mouth daily. Refills:  0     Calcium Carbonate-Vitamin D 600-400 MG-UNIT Tabs      Take 1 tablet by mouth daily.    Refills:  0     lidocaine 5 % Oint  Commonly known as:  XYLOCAINE

## 2019-11-01 NOTE — PROGRESS NOTES
[address]  [id number]  Hospital Day #8  Date of Consult: 10/28/19  Patient seen at: [address]    Subjective:      Patient was seen and examined with her RN Trell Jo at the injection 1.25 mg, 1.25 mg, Intravenous, Q6H PRN  •  0.9% NaCl infusion, , Intravenous, Continuous  No current outpatient medications on file.       Labs/ imaging     Hematology:  Lab Results   Component Value Date    WBC 8.4 10/30/2019    HGB 9.8 (L) 10/30 Disease  Can’t do any work Max Assist  Total Care Minimal Drowsy/confused   10 Bedbound/coma Extensive Disease  Can’t do any work  coma  Max Assist  Total Care Mouth care Drowsy or coma   0 Death     Palliative Care Assessment     Goals of Care: Continue c physical examination and >50% was spent counseling and coordinating care. We will continue to follow. Jose Mera MD   11/1/2019  9:00 AM

## 2019-11-01 NOTE — PLAN OF CARE
Pt alert and calm throughout shift. Pt cooperative and able to take medications and meals. Pt advanced to thin liquids per speech - MD aware.

## 2019-11-01 NOTE — PLAN OF CARE
Report given to Wayne Rangel at [facility]. IV and tele removed. Per Dr. Oumou juares for patient to transfer to nursing home with mayfield in place.

## 2019-11-01 NOTE — PLAN OF CARE
Problem: Safety Risk - Non-Violent Restraints  Goal: Patient will remain free from self-harm  Description  INTERVENTIONS:  - Apply the least restrictive restraint to prevent harm  - Notify patient and family of reasons restraints applied  - Assess for an additional Care Plan goals for specific interventions   11/1/2019 0110 by Charli Shah RN  Outcome: Progressing  11/1/2019 0042 by Charli Shah RN  Outcome: Progressing     Problem: Delirium  Goal: Minimize duration of delirium  Description  Interventi hemorrhage  - Monitor temperature, glucose, and sodium.  Initiate appropriate interventions as ordered  11/1/2019 0110 by Dave Dang RN  Outcome: Progressing  11/1/2019 0042 by Dave Dang RN  Outcome: Progressing  Goal: Absence of seizures  Descript Assess pt frequently for physical needs  - Identify cognitive and physical deficits and behaviors that affect risk of falls.   - [place] fall precautions as indicated by assessment.  - Educate pt/family on patient safety including physical limitations  - be patient, do not interrupt  - Minimize distractions  - Allow time for understanding and response  - Establish method for patient to ask for assistance (call light)  - Provide an  as needed  - Communicate barriers and strategies to overcome wit SONIDO Akhtar  Outcome: Progressing  Goal: Hemodynamic stability and optimal renal function maintained  Description  INTERVENTIONS:  - Monitor labs and assess for signs and symptoms of volume excess or deficit  - Monitor intake, output and patient weight  - Mo at a slow rat  - Encourage small bites of food and small sips of liquid  - Offer pills one at a time, crush or deliver with applesauce as needed    11/1/2019 0110 by Lis Sharma RN  Outcome: Progressing  11/1/2019 0042 by Lis Sharma RN  Outcome: Pro

## 2019-11-01 NOTE — SLP NOTE
SPEECH DAILY NOTE - INPATIENT    ASSESSMENT & PLAN   ASSESSMENT  Pt seen for dysphagia tx to assess tolerance with recommended diet, ensure proper utilization of aspiration precautions and provide pt/family education.   Pt found sitting up in bed with son a over 2 session(s). Explained strategies to son who was feeding patient. He demonstrated understanding by following recommended strategies.   In Progress   Goal #3 The patient will tolerate trial upgrade of puree consistency and thin  liquids without ove

## 2019-11-01 NOTE — CM/SW NOTE
RN informed SW that pt is medically stable to discharge today and will need ambulance transport (complete).  NEETU spoke w/ Sandy Mckeon from [facility] and arranged ambulance to [facility] at [place] [address] - per son request.     Jacqueline Smith from [facility] stated they will be ready for pt at

## 2019-11-01 NOTE — PROGRESS NOTES
[facility]     Progress Note        Radha Hassan Patient Status:  Inpatient    1930 MRN [id number]   Location [facility] 2W/SW Attending Katerina Ha,    Hosp Day # 8 PCP Gayle Forbes MD       Subject warm, dry      Results:     Lab Results   Component Value Date    K 3.7 11/01/2019                   Assessment   1. Acute hypoxemic respiratory failure, improved  2. Acute encephalopathy  3. Hypertension  4. Dementia  5. Hyperlipidemia  6.   Likely as

## 2020-02-03 ENCOUNTER — APPOINTMENT (OUTPATIENT)
Dept: GENERAL RADIOLOGY | Facility: HOSPITAL | Age: 85
DRG: 195 | End: 2020-02-03
Attending: EMERGENCY MEDICINE
Payer: MEDICARE

## 2020-02-03 ENCOUNTER — HOSPITAL ENCOUNTER (INPATIENT)
Facility: HOSPITAL | Age: 85
LOS: 3 days | Discharge: NURSING, REHAB OR LONG-TERM CARE | DRG: 195 | End: 2020-02-06
Attending: EMERGENCY MEDICINE | Admitting: INTERNAL MEDICINE
Payer: MEDICARE

## 2020-02-03 DIAGNOSIS — J18.9 PNEUMONIA OF LEFT LOWER LOBE DUE TO INFECTIOUS ORGANISM: Primary | ICD-10-CM

## 2020-02-03 PROBLEM — G30.1 LATE ONSET ALZHEIMER'S DISEASE WITH BEHAVIORAL DISTURBANCE (HCC): Status: ACTIVE | Noted: 2020-02-03

## 2020-02-03 PROBLEM — F02.81 LATE ONSET ALZHEIMER'S DISEASE WITH BEHAVIORAL DISTURBANCE (HCC): Status: ACTIVE | Noted: 2020-02-03

## 2020-02-03 PROBLEM — F01.50 VASCULAR DEMENTIA WITHOUT BEHAVIORAL DISTURBANCE (HCC): Status: ACTIVE | Noted: 2020-02-03

## 2020-02-03 LAB
ANION GAP SERPL CALC-SCNC: 7 MMOL/L (ref 0–18)
BACTERIA UR QL AUTO: NEGATIVE /HPF
BASOPHILS # BLD AUTO: 0 X10(3) UL (ref 0–0.2)
BASOPHILS NFR BLD AUTO: 0 %
BILIRUB UR QL: NEGATIVE
BUN BLD-MCNC: 25 MG/DL (ref 7–18)
BUN/CREAT SERPL: 40.3 (ref 10–20)
CALCIUM BLD-MCNC: 9.1 MG/DL (ref 8.5–10.1)
CHLORIDE SERPL-SCNC: 111 MMOL/L (ref 98–112)
CLARITY UR: CLEAR
CO2 SERPL-SCNC: 27 MMOL/L (ref 21–32)
COLOR UR: YELLOW
CREAT BLD-MCNC: 0.62 MG/DL (ref 0.55–1.02)
DEPRECATED RDW RBC AUTO: 48.5 FL (ref 35.1–46.3)
EOSINOPHIL # BLD AUTO: 0.06 X10(3) UL (ref 0–0.7)
EOSINOPHIL NFR BLD AUTO: 1.5 %
ERYTHROCYTE [DISTWIDTH] IN BLOOD BY AUTOMATED COUNT: 13.3 % (ref 11–15)
GLUCOSE BLD-MCNC: 89 MG/DL (ref 70–99)
GLUCOSE UR-MCNC: NEGATIVE MG/DL
HCT VFR BLD AUTO: 39 % (ref 35–48)
HGB BLD-MCNC: 12.5 G/DL (ref 12–16)
HGB UR QL STRIP.AUTO: NEGATIVE
IMM GRANULOCYTES # BLD AUTO: 0.01 X10(3) UL (ref 0–1)
IMM GRANULOCYTES NFR BLD: 0.3 %
KETONES UR-MCNC: NEGATIVE MG/DL
LEUKOCYTE ESTERASE UR QL STRIP.AUTO: NEGATIVE
LYMPHOCYTES # BLD AUTO: 1.01 X10(3) UL (ref 1–4)
LYMPHOCYTES NFR BLD AUTO: 25.8 %
MCH RBC QN AUTO: 31.5 PG (ref 26–34)
MCHC RBC AUTO-ENTMCNC: 32.1 G/DL (ref 31–37)
MCV RBC AUTO: 98.2 FL (ref 80–100)
MONOCYTES # BLD AUTO: 0.44 X10(3) UL (ref 0.1–1)
MONOCYTES NFR BLD AUTO: 11.2 %
NEUTROPHILS # BLD AUTO: 2.4 X10 (3) UL (ref 1.5–7.7)
NEUTROPHILS # BLD AUTO: 2.4 X10(3) UL (ref 1.5–7.7)
NEUTROPHILS NFR BLD AUTO: 61.2 %
NITRITE UR QL STRIP.AUTO: NEGATIVE
OSMOLALITY SERPL CALC.SUM OF ELEC: 304 MOSM/KG (ref 275–295)
PH UR: 7 [PH] (ref 5–8)
PLATELET # BLD AUTO: 198 10(3)UL (ref 150–450)
POTASSIUM SERPL-SCNC: 4 MMOL/L (ref 3.5–5.1)
PROT UR-MCNC: NEGATIVE MG/DL
RBC # BLD AUTO: 3.97 X10(6)UL (ref 3.8–5.3)
RBC #/AREA URNS AUTO: 0 /HPF
SODIUM SERPL-SCNC: 145 MMOL/L (ref 136–145)
SP GR UR STRIP: 1.02 (ref 1–1.03)
UROBILINOGEN UR STRIP-ACNC: <2
WBC # BLD AUTO: 3.9 X10(3) UL (ref 4–11)
WBC #/AREA URNS AUTO: <1 /HPF

## 2020-02-03 PROCEDURE — 99285 EMERGENCY DEPT VISIT HI MDM: CPT

## 2020-02-03 PROCEDURE — 96365 THER/PROPH/DIAG IV INF INIT: CPT

## 2020-02-03 PROCEDURE — 85025 COMPLETE CBC W/AUTO DIFF WBC: CPT | Performed by: EMERGENCY MEDICINE

## 2020-02-03 PROCEDURE — 80048 BASIC METABOLIC PNL TOTAL CA: CPT | Performed by: EMERGENCY MEDICINE

## 2020-02-03 PROCEDURE — 81001 URINALYSIS AUTO W/SCOPE: CPT | Performed by: EMERGENCY MEDICINE

## 2020-02-03 PROCEDURE — 71045 X-RAY EXAM CHEST 1 VIEW: CPT | Performed by: EMERGENCY MEDICINE

## 2020-02-03 PROCEDURE — 96368 THER/DIAG CONCURRENT INF: CPT

## 2020-02-03 RX ORDER — ASPIRIN 81 MG/1
81 TABLET ORAL DAILY
Status: DISCONTINUED | OUTPATIENT
Start: 2020-02-03 | End: 2020-02-06

## 2020-02-03 RX ORDER — DEXTROSE AND SODIUM CHLORIDE 5; .45 G/100ML; G/100ML
INJECTION, SOLUTION INTRAVENOUS CONTINUOUS
Status: DISCONTINUED | OUTPATIENT
Start: 2020-02-03 | End: 2020-02-06

## 2020-02-03 RX ORDER — ACETAMINOPHEN 500 MG
1000 TABLET ORAL 3 TIMES DAILY
Status: DISCONTINUED | OUTPATIENT
Start: 2020-02-03 | End: 2020-02-06

## 2020-02-03 NOTE — PLAN OF CARE
Problem: Patient Centered Care  Goal: Patient preferences are identified and integrated in the patient's plan of care  Description  Interventions:  - What would you like us to know as we care for you?  I speak Frisian   - Provide timely, complete, and acc period  Description  INTERVENTIONS  - Monitor WBC  - Administer growth factors as ordered  - Implement neutropenic guidelines  Outcome: Progressing     Problem: SAFETY ADULT - FALL  Goal: Free from fall injury  Description  INTERVENTIONS:  - Assess pt freq visual cues when possible  - Listen attentively, be patient, do not interrupt  - Minimize distractions  - Allow time for understanding and response  - Establish method for patient to ask for assistance (call light)  - Provide an  as needed  - Co Fall precautions in place. Call light within reach. Frequent rounding. Will continue to monitor.

## 2020-02-03 NOTE — CM/SW NOTE
2/6 226pm: The pt. Is scheduled to discharge back to [facility] today 2/6 at 330p, via ambulance. The pt's son Ashtyn Christine is aware and agreeable to the above. SW also notified [facility] of discharge.    Report [phone]  -----------------------

## 2020-02-03 NOTE — ED INITIAL ASSESSMENT (HPI)
Triage: arrived via private ambulance for not taking medications or eating over last couple days living in [facility] dementia care unit, primarily Afghan speaking baseline axo1-2, also had chest xray at outside facility showing opacity in LLobe and PMD or

## 2020-02-03 NOTE — ED NOTES
80year old female here from [facility] unit for LLower lobe opacity on  chest xray and not eating/taking meds for a couple days per EMS, pt axo1-2 at baseline and primarily Senegalese speaking, at baseline does not answer questions in [place]eg[place]e pe

## 2020-02-03 NOTE — H&P
[place] [facility] - [facility]    History and [address] Patient Status:  Inpatient    1930 MRN [id number]   [facility] Attending Christos Claros MD   Hosp Day # 0 PCP Alpa Baca MD     Date:   Unable to perform ROS: Mental status change       Physical Exam:   Vital Signs:  Blood pressure 117/74, pulse 80, temperature 97.7 °F (36.5 °C), temperature source Axillary, resp. rate 18, SpO2 95 %, not currently breastfeeding.     Nursing note and vit Hyperlipidemia  Limited benefit to statin Rx given very limited prognosis. Counseling regarding advance care planning and goals of care  Hospice evaluation requested given limited prognosis and poor quality of life.       Essential hypertension  No nee

## 2020-02-04 PROBLEM — E43 SEVERE PROTEIN-CALORIE MALNUTRITION (HCC): Status: ACTIVE | Noted: 2020-02-04

## 2020-02-04 LAB
ALBUMIN SERPL-MCNC: 2.7 G/DL (ref 3.4–5)
ALBUMIN/GLOB SERPL: 0.7 {RATIO} (ref 1–2)
ALP LIVER SERPL-CCNC: 99 U/L (ref 55–142)
ALT SERPL-CCNC: 24 U/L (ref 13–56)
ANION GAP SERPL CALC-SCNC: 5 MMOL/L (ref 0–18)
AST SERPL-CCNC: 33 U/L (ref 15–37)
BILIRUB SERPL-MCNC: 0.3 MG/DL (ref 0.1–2)
BUN BLD-MCNC: 24 MG/DL (ref 7–18)
BUN/CREAT SERPL: 33.8 (ref 10–20)
CALCIUM BLD-MCNC: 8.5 MG/DL (ref 8.5–10.1)
CHLORIDE SERPL-SCNC: 112 MMOL/L (ref 98–112)
CO2 SERPL-SCNC: 27 MMOL/L (ref 21–32)
CREAT BLD-MCNC: 0.71 MG/DL (ref 0.55–1.02)
GLOBULIN PLAS-MCNC: 3.7 G/DL (ref 2.8–4.4)
GLUCOSE BLD-MCNC: 103 MG/DL (ref 70–99)
HAV IGM SER QL: 2.1 MG/DL (ref 1.6–2.6)
M PROTEIN MFR SERPL ELPH: 6.4 G/DL (ref 6.4–8.2)
OSMOLALITY SERPL CALC.SUM OF ELEC: 302 MOSM/KG (ref 275–295)
POTASSIUM SERPL-SCNC: 3.8 MMOL/L (ref 3.5–5.1)
PREALB SERPL-MCNC: 14.1 MG/DL (ref 20–40)
SODIUM SERPL-SCNC: 144 MMOL/L (ref 136–145)

## 2020-02-04 PROCEDURE — 84134 ASSAY OF PREALBUMIN: CPT | Performed by: INTERNAL MEDICINE

## 2020-02-04 PROCEDURE — 83735 ASSAY OF MAGNESIUM: CPT | Performed by: INTERNAL MEDICINE

## 2020-02-04 PROCEDURE — 80053 COMPREHEN METABOLIC PANEL: CPT | Performed by: INTERNAL MEDICINE

## 2020-02-04 NOTE — PROGRESS NOTES
[place] [facility] - [facility]    History and [address] Patient Status:  Inpatient    1930 MRN [id number]   Location [facility] 4W/SW/SE Attending Manan Yeung MD   Hosp Day # 1 PCP Isaias Cole MD     Date:   Unable to perform ROS: Mental status change       Physical Exam:   Vital Signs:  Blood pressure 121/58, pulse 65, temperature 96.8 °F (36 °C), temperature source Axillary, resp.  rate 16, weight 92 lb 6.4 oz (41.9 kg), SpO2 97 %, not currently breastfee on 2/03/2020 at 13:38                Assessment/Plan:     Pneumonia of left lower lobe due to infectious organism  May account for AMS. IV Rocephin and azithromycin started. Minimal respiratory symptoms at present.       Hyperlipidemia  Limited benefit to s

## 2020-02-04 NOTE — PLAN OF CARE
Problem: Patient Centered Care  Goal: Patient preferences are identified and integrated in the patient's plan of care  Description  Interventions:  - What would you like us to know as we care for you?  I speak Romanian   - Provide timely, complete, and acc SCHEDULED TYLENOL 3X DAY. REFUSED TO OPEN MOUTH THIS EVENING TO TAKE MEDS. ASSISTING W/ REPOSITIONING WHILE BED TURN Q2H.        Problem: RISK FOR INFECTION - ADULT  Goal: Absence of fever/infection during anticipated neutropenic period  Description  INTER order or complex needs related to functional status, cognitive ability or social support system  Outcome: Progressing  Note:   RECENTLY ADMITTED . DC PLAN YET TBD.       Problem: Altered Communication/Language Barrier  Goal: Patient/Family is able to [facility] condition  - Facilitate patient and family articulation of goals for care  - Help patient and family identify pros/cons of alternative solutions  - Provide information as requested by patient/family  - Respect patient/family right to receive or not to rece

## 2020-02-04 NOTE — PLAN OF CARE
Problem: Patient Centered Care  Goal: Patient preferences are identified and integrated in the patient's plan of care  Description  Interventions:  - What would you like us to know as we care for you?  I speak Danish   - Provide timely, complete, and acc period  Description  INTERVENTIONS  - Monitor WBC  - Administer growth factors as ordered  - Implement neutropenic guidelines  Outcome: Progressing     Problem: SAFETY ADULT - FALL  Goal: Free from fall injury  Description  INTERVENTIONS:  - Assess pt freq visual cues when possible  - Listen attentively, be patient, do not interrupt  - Minimize distractions  - Allow time for understanding and response  - Establish method for patient to ask for assistance (call light)  - Provide an  as needed  - Co precautions in place. Call light within reach. Frequent rounding. Will continue to monitor.

## 2020-02-04 NOTE — HOSPICE RN NOTE
Met with son Surya Goddard at bedside to discuss hospice services and goals of care. Explained to him the different levels of care and what is involved in each. Explained how hospice would work at [facility] and how the discharge planning would work.  Booklet provid

## 2020-02-04 NOTE — ED PROVIDER NOTES
Patient Seen in: [facility] 4w/sw/se    History   Patient presents with:  Altered Mental Status    Stated Complaint: ams    HPI    Patient has sig dementia and essentially nonverbal.  Family notes increased cough, weight loss, not eating.   No feve 22   Temp 98.4 °F (36.9 °C)   Temp src Oral   SpO2 94 %   O2 Device None (Room air)       Current:/64 (BP Location: Right arm)   Pulse 74   Temp 97.8 °F (36.6 °C) (Axillary)   Resp 18   Wt 41.9 kg   SpO2 94%   BMI 21.48 kg/m²    PULSE OX Nl on room a 74  , sinus,      Radiology findings: Xr Chest Ap Portable  (cpt=71045)    Result Date: 2/3/2020  CONCLUSION:  1. Borderline cardiomegaly. Coronary artery vascular stent 2. Left basilar atelectatic changes and/or parenchymal abnormality has progressed.   Eyal Hendricks

## 2020-02-05 NOTE — PROGRESS NOTES
[facility]    Progress note    Dawson Tinoco Patient Status:  Inpatient    1930 MRN [id number]   Location [facility] 4W/SW/SE Attending Christos Claros MD   Hosp Day # 2 PCP Alpa Baca MD     Date:  2020 perform ROS: Mental status change       Physical Exam:   Vital Signs:  Blood pressure 125/63, pulse 66, temperature 97.1 °F (36.2 °C), temperature source Axillary, resp. rate 18, weight 92 lb 6.4 oz (41.9 kg), SpO2 98 %, not currently breastfeeding.     Kayla Signs (POA) at bedside. Pt still interactive with family members and they are reluctant re hospice care at this time but acknowledge that it will likely be appropriate soon. Son seems interested in palliative care on discharge.       Essential hypertension  No ne

## 2020-02-05 NOTE — PLAN OF CARE
Pt is Upper sorbian speaking, confused hx of dementia. Scheduled tylenol for pain management. Pills taken crushed in applesauce. IVF and abxIncontinent, purewick in place. Frequent rounding and repositioning.    Problem: Patient Centered Care  Goal: Patient prefe pain  - Anticipate increased pain with activity and pre-medicate as appropriate  Outcome: Progressing     Problem: RISK FOR INFECTION - ADULT  Goal: Absence of fever/infection during anticipated neutropenic period  Description  INTERVENTIONS  - Monitor WBC Patient/Family is able to understand and participate in their care  Description  Interventions:  - Assess communication ability and preferred communication style  - Implement communication aides and strategies  - Use visual cues when possible  - Listen att

## 2020-02-05 NOTE — PLAN OF CARE
Patient in bed throughout shift, repositioned as needed. Aquacel placed to sacrum, redness noted. Meds given crushed with applesauce. Good appetite, needs help being fed. Cough remains minimal. Plan to d/c to [facility] with palliative. Nelly in place. if interventions unsuccessful or patient reports new pain  - Anticipate increased pain with activity and pre-medicate as appropriate  Outcome: Progressing     Problem: RISK FOR INFECTION - ADULT  Goal: Absence of fever/infection during anticipated neutrope Altered Communication/Language Barrier  Goal: Patient/Family is able to understand and participate in their care  Description  Interventions:  - Assess communication ability and preferred communication style  - Implement communication aides and strategies Conference as is appropriate  Outcome: Progressing     Problem: SKIN/TISSUE INTEGRITY - ADULT  Goal: Skin integrity remains intact  Description  INTERVENTIONS  - Assess and document risk factors for pressure ulcer development  - Assess and document skin in

## 2020-02-05 NOTE — HOSPICE RN NOTE
[facility] with son Manny Lex the [place]. he told us at this time he would like Palliative Care for his mother. POC was discussed with Vanessa Lawrence. POC discussed with tSormy Medina.

## 2020-02-06 VITALS
BODY MASS INDEX: 21 KG/M2 | HEART RATE: 77 BPM | SYSTOLIC BLOOD PRESSURE: 125 MMHG | WEIGHT: 92.38 LBS | RESPIRATION RATE: 18 BRPM | DIASTOLIC BLOOD PRESSURE: 79 MMHG | OXYGEN SATURATION: 98 % | TEMPERATURE: 96 F

## 2020-02-06 RX ORDER — AZITHROMYCIN 250 MG/1
250 TABLET, FILM COATED ORAL DAILY
Qty: 3 TABLET | Refills: 0 | Status: SHIPPED | OUTPATIENT
Start: 2020-02-06

## 2020-02-06 NOTE — DIETARY NOTE
ADULT NUTRITION INITIAL ASSESSMENT    Pt is at moderate nutrition risk. Pt meets moderate malnutrition criteria.       CRITERIA FOR MALNUTRITION DIAGNOSIS:  Criteria for non-severe malnutrition diagnosis: chronic illness related to energy intake less than7 hypertension, Hyperlipidemia, and Knee pain. ANTHROPOMETRICS:  HT:  4'7\"  WT: 41.9 kg (92 lb 6.4 oz)   BMI: Body mass index is 21.48 kg/m².   BMI CLASSIFICATION: 19-24.9 kg/m2 - WNL  Consider underweight in older adult(<23 kg/m2)  IBW: 90 lbs        103 Regular/General and Chopped  Oral Supplements: as above  ESTIMATED NUTRITION NEEDS:  Calories: 1257 calories/day (30 calories per kg Current wt)  Protein: 54 grams protein/day (1.3 grams protein per kg Current wt)    MONITOR AND EVALUATE/NUTRITION GOALS:

## 2020-02-06 NOTE — CDS QUERY
Pneumonia Specificity  CLINICAL DOCUMENTATION CLARIFICATION FORM    Dear Doctor:  Clinical information in the medical record indicates Pneumonia of left lower lobe due to infectious organism.   For accurate ICD-10-CM code assignment to reflect severity of i

## 2020-02-06 NOTE — PLAN OF CARE
Problem: Patient Centered Care  Goal: Patient preferences are identified and integrated in the patient's plan of care  Description  Interventions:  - What would you like us to know as we care for you?  I speak Maltese   - Provide timely, complete, and acc fever/infection during anticipated neutropenic period  Description  INTERVENTIONS  - Monitor WBC  - Administer growth factors as ordered  - Implement neutropenic guidelines  Outcome: Adequate for Discharge     Problem: SAFETY ADULT - FALL  Goal: Free from preferred communication style  - Implement communication aides and strategies  - Use visual cues when possible  - Listen attentively, be patient, do not interrupt  - Minimize distractions  - Allow time for understanding and response  - Establish method for intact  Description  INTERVENTIONS  - Assess and document risk factors for pressure ulcer development  - Assess and document skin integrity  - Monitor for areas of redness and/or skin breakdown  - Initiate interventions, skin care algorithm/standards of ca

## 2020-02-06 NOTE — PLAN OF CARE
No acute changes overnight. Incontinent of stool, prompt incontinence care provided. Purewick in place. IVF running. Meds given crushed with pudding. Plan to d/c to [place] with pallative care and PO abx when medically stable.     Problem: Patient Patsy Olsen or patient reports new pain  - Anticipate increased pain with activity and pre-medicate as appropriate  Outcome: Progressing     Problem: RISK FOR INFECTION - ADULT  Goal: Absence of fever/infection during anticipated neutropenic period  Description  INTER Barrier  Goal: Patient/Family is able to understand and participate in their care  Description  Interventions:  - Assess communication ability and preferred communication style  - Implement communication aides and strategies  - Use visual cues when possibl Progressing     Problem: SKIN/TISSUE INTEGRITY - ADULT  Goal: Skin integrity remains intact  Description  INTERVENTIONS  - Assess and document risk factors for pressure ulcer development  - Assess and document skin integrity  - Monitor for areas of redness

## 2020-02-06 NOTE — PROGRESS NOTES
[facility]    Progress note    Yelitza Nunes Patient Status:  Inpatient    1930 MRN [id number]   Location [facility] 4W/SW/SE Attending Mega Guy MD   Hosp Day # 3 PCP Kaiden rAnett MD     Date:  2020 2000 units Oral Cap, Take by mouth. Review of Systems:     Unable to perform ROS: Mental status change       Physical Exam:   Vital Signs:  Blood pressure 125/79, pulse 77, temperature (!) 96.4 °F (35.8 °C), temperature source Axillary, resp.  rate 1 advance care planning and goals of care  Hospice evaluation requested given limited prognosis and poor quality of life. D/W son (POA) at bedside.  Pt still interactive with family members and they are reluctant re hospice care at this time but acknowledge t

## 2020-03-12 NOTE — DISCHARGE SUMMARY
[facility]    PATIENT'S NAME: [facility]   ATTENDING PHYSICIAN: Dev Cheek.  Silvia Neumann MD   PATIENT ACCOUNT#:   [id number]    LOCATION:  [address] [place] [address] RECORD #:   [id number]       YOB: 1930  ADMISSION DATE: gradually resolved. Only minimal respiratory symptoms were present by the time of discharge. Given the patient's overall steady decline in quality of life, hospice evaluation was requested.   Following discussions with the son and power of , palli

## (undated) NOTE — LETTER
[id number] [facility], [address] [place] [address], [address]  [address] [place] [address] [phone]        Dear Ishaan Rabago MD,      I had the pleasure of seeing your patient, Lashell Sam on 8/1/2017.      Below please find imaging of her brain. Current Outpatient Prescriptions:  Pravastatin Sodium 10 MG Oral Tab Take 1 tablet by mouth daily. Disp:  Rfl: 0   Meclizine HCl 12.5 MG Oral Tab Take 1 tablet by mouth as needed.  Disp:  Rfl: 0   aspirin 81 MG Oral Tab Take 81 mg Skin: No rash or lesions. Extremities: No edema, no erythema. Good peripheral pulses. Neuro:  Higher Integrative Functions:  Alert and cooperative, with normal attention span and concentration. Patient's primary language is [address]   Cognitive function call me when the MRI is been completed, and a decision on further treatment will be made at that time. Thank you very much. Return in about 3 months (around 11/1/2017). Court Roberts.  Gopi Quinteros MD

## (undated) NOTE — IP AVS SNAPSHOT
[facility]            (For Outpatient Use Only) Initial Admit Date: 10/24/2019   Inpt/Obs Admit Date: Inpt: 10/24/19 / Obs: N/A   Discharge Date:    Fady Dutta:  [de-identified]   MRN: [de-identified]   CSN: [id number]   CEID: [id number]        E Subscriber Name:  Cynthia Glez :    Subscriber ID:  Pt Rel to Subscriber:    Hospital Account Financial Class: Medicare    2019

## (undated) NOTE — IP AVS SNAPSHOT
[facility]            (For Outpatient Use Only) Initial Admit Date: 2/3/2020   Inpt/Obs Admit Date: Inpt: 2/3/20 / Obs: N/A   Discharge Date:    Lashell Magaña:  [de-identified]   MRN: [de-identified]   CSN: [id number]   CEID: [id number]        [facility] Subscriber Name:  Keyonna Patricio :    Subscriber ID:  Pt Rel to Subscriber:    Hospital Account Financial Class: Medicare    2020

## (undated) NOTE — IP AVS SNAPSHOT
Patient Demographics     Address  [address] [place] Phone  [phone] [facility])  [phone] [facility]      Emergency Contact(s)     Name Relation Home Work Fercho Son [phone]        Allergies as of 2/6/2020  Revie [id number] - MAR ACTION REPORT  (last 24 hrs)    ** SITE UNKNOWN **     Order ID Medication Name Action Time Action Reason Comments    [id number] acetaminophen (TYLENOL EXTRA STRENGTH) tab 1,000 mg 02/05/20 2040 Given      [id number] acetaminophen (TYLENOL EX Past Medical History:   Diagnosis Date   • Blind left eye    • Cataract    • Dementia [facility])    • Essential hypertension    • Hyperlipidemia    • Knee pain      Past Surgical History:   Procedure Laterality Date   • KNEE SURGERY Right    • SURGICAL STENT Abdominal: Soft. Bowel sounds are normal. There is no rebound and no guarding. Musculoskeletal:      Comments: Extensive OA joint changes. Neurological:   Lethargic and nonverbal when awakened. Skin: Skin is warm and dry.    Psychiatric:   Unable to a Based on patients current state of illness, I anticipate that, after discharge, patient will require TBD. Hua Nation MD  2/3/2020[JH.1]    Electronically signed by Toni Wilder MD on 2/3/2020  4:55 PM   [facility]. 1 - Toni Wilder,

## (undated) NOTE — ED AVS SNAPSHOT
Shara España   MRN: [id number]    Department:  [facility]   Date of Visit:  3/30/2019           Disclosure     Insurance plans vary and the physician(s) referred by the ER may not be covered by your plan.  Please c within the next three months to obtain basic health screening including reassessment of your blood pressure.     IF THERE IS ANY CHANGE OR WORSENING OF YOUR CONDITION, CALL YOUR PRIMARY CARE PHYSICIAN AT ONCE OR RETURN IMMEDIATELY TO THE EMERGENCY DEPARTMEN

## (undated) NOTE — IP AVS SNAPSHOT
Patient Demographics     Address  [address] [place] Phone  [phone] [facility])  [phone] [facility]      Emergency Contact(s)     Name Relation Home Work Fercho [place] [phone]        Allergies as of 11/1/2019  Revi MIRALAX Powd  Generic drug:  Polyethylene Glycol 3350  Next dose due: Tomorrow 9 am      Take 17 g by mouth daily. MULTIVITAL-M Tabs  Next dose due: Tomorrow 9 am      Take by mouth.           nystatin 298361 UNIT/GM Oint  Commonly known as:  [address] Mask size  Medium   Set rate  12 breaths/min   Set IPAP  15   Set EPAP  5   O2%  30 %   I time  1         Lab Results Last 24 Hours      RAINBOW DRAW LAVENDER [[id number]]  Resulted: 11/01/19 0800, Result status: Final result   Ordering provider:  Manjit Flores, Author:  Ezra Cormier DO Service:  — Author Type:  Physician    Filed:  10/24/2019  5:03 PM Date of Service:  10/24/2019  4:54 PM Status:  Signed    :  Ezra Cormier DO (Physician)       [facility]    History & Physical propofol (DIPRIVAN) infusion, 5-100 mcg/kg/min, Intravenous, Continuous      (Not in a hospital admission)      Allergies  No Known Allergies    Review of Systems:   Unable to obtain secondary to patient's current clinical condition    Physical Exam:   Blo 1.  Acute hypoxemic respiratory failure  2. Acute encephalopathy  3. Hypertensive urgency  4. Dementia  5. Hyperlipidemia[WH.1]  6.   Leukocytosis[WH.2]    Plan[WH.1]   -Patient presents with evidence of acute encephalopathy and hypoxemic respiratory fa Hospital course complicated by acute respiratory failure which required intubation, mechanical ventilation and ICU admission. She was extubated and transferred to the floor. Currently, she is on fifth floor.  On evaluation she has noted uncontrolled respira •  Heparin Sodium (Porcine) 5000 UNIT/ML injection 5,000 Units, 5,000 Units, Subcutaneous, 2 times per day  •  alendronate (FOSAMAX) tab 35 mg, 35 mg, Oral, Q7 Days  •  ipratropium-albuterol (DUONEB) nebulizer solution 3 mL, 3 mL, Nebulization, Q6H PRN  • General: Alert awake and in no apparent respiratory distress. Appears chronically ill. Uncontrolled respiratory secretions noted. Body habitus: Well Nourished  Psychiatric: No agitation or anxiety noted. Unable to discern her words.     Palliative Performa CODE STATUS DNI CODE STATUS  He wants to honor this. This was changed in EMR. He wants to meet for a full evaluation in person tomorrow at 9 AM in her room.     Advance Care Planning counseling and discussion:   Patient's preference about sharing medica - Discussed today’s visit with Dr Cassandra Mccullough    Thank you for allowing Palliative Care services to participate in the care of Ms Karrie Vizcarra    A total of 30 minutes were spent on this consult; which included all of the following: direct face to face contact, Physical Therapy Note signed by Kenroy Arrington PT at 10/30/2019  4:25 PM  Version 1 of 2    Author:  Kenroy Arrington PT Service:  Rehab Author Type:  Physical Therapist    Filed:  10/30/2019  4:25 PM Date of Service:  10/30/2019  4:20 PM Sta Author:  Manfred Payne OT Service:  Rehab Author Type:  Occupational Therapist    Filed:  10/29/2019  1:39 PM Date of Service:  10/29/2019  9:57 AM Status:  Signed    :  Manfred Payne OT (Occupational Therapist)       OCCUPATIONAL THERAPY EVALUATION - The patient's Approx Degree of Impairment: 79.59% has been calculated based on documentation in the [facility] '6 clicks' Inpatient Daily Activity Short Form. Research supports that patients with this level of impairment may benefit from subacute rehab.      DI Rating: (Pt did not appear to be in pain )          ACTIVITY TOLERANCE  Pulse: 89  Heart Rate Source: Monitor                   O2 SATURATIONS        SPO2 Ambulation on Oxygen: (100% at rest on 3LNC )       COGNITION  Overall Cognitive Status:  Impaired Patients self stated goal is: Unable to state her goals at this, son would like pt to be more alert and improve function     Patient will complete feeding skills with set up. Comment:     Patient will complete grooming with set up.    Comment:     Patient consistencies;Small bites and sips;Multiple swallows(Feeding with 1:1 assistance; eliminate distractions)  Aspiration Precautions: Upright position; Slow rate;Small bites and sips; No straw  Medication Administration Recommendations: Crushed in puree    Kelsei Session: 2/3    If you have any questions, please contact [address] [place] [address]  Dawit Galindo M.S., CCC-SLP/L  Speech-Language Pathologist[YIN1]     Electronically signed by LUIS MANUEL Love on 11/1/2019 10:44 AM   Attribution Sam    FABIENNE - Khushbu Monroy,